# Patient Record
Sex: FEMALE | Race: WHITE | NOT HISPANIC OR LATINO | Employment: FULL TIME | URBAN - METROPOLITAN AREA
[De-identification: names, ages, dates, MRNs, and addresses within clinical notes are randomized per-mention and may not be internally consistent; named-entity substitution may affect disease eponyms.]

---

## 2016-08-17 LAB
EXTERNAL ANTIBODY SCREEN: NEGATIVE
EXTERNAL CHLAMYDIA SCREEN: NEGATIVE
EXTERNAL GONORRHEA SCREEN: NEGATIVE
EXTERNAL RUBELLA QUALITATIVE: NORMAL
EXTERNAL SYPHILIS RPR SCREEN: NORMAL
HIV1 AB SPEC QL IA.RAPID: NEGATIVE

## 2017-02-01 ENCOUNTER — LAB REQUISITION (OUTPATIENT)
Dept: LAB | Facility: HOSPITAL | Age: 32
End: 2017-02-01

## 2017-02-01 DIAGNOSIS — Z34.83 ENCOUNTER FOR SUPERVISION OF OTHER NORMAL PREGNANCY, THIRD TRIMESTER: ICD-10-CM

## 2017-02-01 PROCEDURE — 87081 CULTURE SCREEN ONLY: CPT | Performed by: OBSTETRICS & GYNECOLOGY

## 2017-02-02 ENCOUNTER — HOSPITAL ENCOUNTER (OUTPATIENT)
Facility: HOSPITAL | Age: 32
End: 2017-02-02
Attending: OBSTETRICS & GYNECOLOGY | Admitting: OBSTETRICS & GYNECOLOGY

## 2017-02-04 LAB — BACTERIA SPEC AEROBE CULT: NORMAL

## 2017-02-22 ENCOUNTER — HOSPITAL ENCOUNTER (OUTPATIENT)
Dept: LABOR AND DELIVERY | Facility: HOSPITAL | Age: 32
Discharge: HOME | End: 2017-02-22

## 2017-02-27 ENCOUNTER — HOSPITAL ENCOUNTER (INPATIENT)
Dept: LABOR AND DELIVERY | Facility: HOSPITAL | Age: 32
LOS: 3 days | Discharge: HOME | End: 2017-03-02
Attending: OBSTETRICS & GYNECOLOGY | Admitting: OBSTETRICS & GYNECOLOGY

## 2017-02-27 PROBLEM — Z3A.39 39 WEEKS GESTATION OF PREGNANCY: Status: ACTIVE | Noted: 2017-02-27

## 2017-02-27 PROCEDURE — 86900 BLOOD TYPING SEROLOGIC ABO: CPT

## 2017-02-27 PROCEDURE — 85027 COMPLETE CBC AUTOMATED: CPT | Performed by: OBSTETRICS & GYNECOLOGY

## 2017-02-27 PROCEDURE — 86850 RBC ANTIBODY SCREEN: CPT

## 2017-02-27 PROCEDURE — 86901 BLOOD TYPING SEROLOGIC RH(D): CPT

## 2017-02-27 PROCEDURE — 59025 FETAL NON-STRESS TEST: CPT

## 2017-02-27 RX ORDER — ONDANSETRON 4 MG/1
4 TABLET, FILM COATED ORAL EVERY 6 HOURS PRN
Status: DISCONTINUED | OUTPATIENT
Start: 2017-02-27 | End: 2017-02-28 | Stop reason: HOSPADM

## 2017-02-27 RX ORDER — SODIUM CHLORIDE, SODIUM LACTATE, POTASSIUM CHLORIDE, CALCIUM CHLORIDE 600; 310; 30; 20 MG/100ML; MG/100ML; MG/100ML; MG/100ML
125 INJECTION, SOLUTION INTRAVENOUS CONTINUOUS
Status: DISCONTINUED | OUTPATIENT
Start: 2017-02-28 | End: 2017-03-02 | Stop reason: HOSPADM

## 2017-02-27 RX ORDER — SODIUM CHLORIDE 0.9 % (FLUSH) 0.9 %
1-10 SYRINGE (ML) INJECTION AS NEEDED
Status: DISCONTINUED | OUTPATIENT
Start: 2017-02-27 | End: 2017-02-28 | Stop reason: HOSPADM

## 2017-02-27 RX ORDER — ACETAMINOPHEN 325 MG/1
650 TABLET ORAL EVERY 4 HOURS PRN
Status: DISCONTINUED | OUTPATIENT
Start: 2017-02-27 | End: 2017-02-28 | Stop reason: HOSPADM

## 2017-02-27 RX ORDER — FERROUS SULFATE 325(65) MG
325 TABLET ORAL
COMMUNITY
End: 2017-03-02 | Stop reason: HOSPADM

## 2017-02-27 RX ORDER — PRENATAL WITH FERROUS FUM AND FOLIC ACID 3080; 920; 120; 400; 22; 1.84; 3; 20; 10; 1; 12; 200; 27; 25; 2 [IU]/1; [IU]/1; MG/1; [IU]/1; MG/1; MG/1; MG/1; MG/1; MG/1; MG/1; UG/1; MG/1; MG/1; MG/1; MG/1
TABLET ORAL DAILY
COMMUNITY
End: 2018-03-13

## 2017-02-27 RX ORDER — ONDANSETRON 2 MG/ML
4 INJECTION INTRAMUSCULAR; INTRAVENOUS EVERY 6 HOURS PRN
Status: DISCONTINUED | OUTPATIENT
Start: 2017-02-27 | End: 2017-02-28 | Stop reason: HOSPADM

## 2017-02-27 RX ORDER — RANITIDINE HCL 75 MG
75 TABLET ORAL 2 TIMES DAILY
COMMUNITY
End: 2017-03-02 | Stop reason: HOSPADM

## 2017-02-27 RX ADMIN — SODIUM CHLORIDE, POTASSIUM CHLORIDE, SODIUM LACTATE AND CALCIUM CHLORIDE 1000 ML: 600; 310; 30; 20 INJECTION, SOLUTION INTRAVENOUS at 22:50

## 2017-02-28 ENCOUNTER — ANESTHESIA (OUTPATIENT)
Dept: LABOR AND DELIVERY | Facility: HOSPITAL | Age: 32
End: 2017-02-28

## 2017-02-28 ENCOUNTER — ANESTHESIA EVENT (OUTPATIENT)
Dept: LABOR AND DELIVERY | Facility: HOSPITAL | Age: 32
End: 2017-02-28

## 2017-02-28 LAB
ABO GROUP BLD: NORMAL
BLD GP AB SCN SERPL QL: NEGATIVE
DEPRECATED RDW RBC AUTO: 44.8 FL (ref 37–54)
ERYTHROCYTE [DISTWIDTH] IN BLOOD BY AUTOMATED COUNT: 14.3 % (ref 11.3–14.5)
EXTERNAL GROUP B STREP ANTIGEN: NEGATIVE
HCT VFR BLD AUTO: 33.7 % (ref 34.5–44)
HGB BLD-MCNC: 11.3 G/DL (ref 11.5–15.5)
MCH RBC QN AUTO: 29 PG (ref 27–31)
MCHC RBC AUTO-ENTMCNC: 33.5 G/DL (ref 32–36)
MCV RBC AUTO: 86.6 FL (ref 80–99)
PLATELET # BLD AUTO: 221 10*3/MM3 (ref 150–450)
PMV BLD AUTO: 10.6 FL (ref 6–12)
RBC # BLD AUTO: 3.89 10*6/MM3 (ref 3.89–5.14)
RH BLD: POSITIVE
WBC NRBC COR # BLD: 9.59 10*3/MM3 (ref 3.5–10.8)

## 2017-02-28 PROCEDURE — 10907ZC DRAINAGE OF AMNIOTIC FLUID, THERAPEUTIC FROM PRODUCTS OF CONCEPTION, VIA NATURAL OR ARTIFICIAL OPENING: ICD-10-PCS | Performed by: OBSTETRICS & GYNECOLOGY

## 2017-02-28 PROCEDURE — 59200 INSERT CERVICAL DILATOR: CPT | Performed by: OBSTETRICS & GYNECOLOGY

## 2017-02-28 PROCEDURE — 25010000002 ONDANSETRON PER 1 MG: Performed by: OBSTETRICS & GYNECOLOGY

## 2017-02-28 PROCEDURE — 0KQM0ZZ REPAIR PERINEUM MUSCLE, OPEN APPROACH: ICD-10-PCS | Performed by: OBSTETRICS & GYNECOLOGY

## 2017-02-28 PROCEDURE — 51703 INSERT BLADDER CATH COMPLEX: CPT

## 2017-02-28 PROCEDURE — 25010000002 ROPIVACAINE PER 1 MG: Performed by: NURSE ANESTHETIST, CERTIFIED REGISTERED

## 2017-02-28 PROCEDURE — C1755 CATHETER, INTRASPINAL: HCPCS

## 2017-02-28 PROCEDURE — 25010000002 BUTORPHANOL PER 1 MG: Performed by: OBSTETRICS & GYNECOLOGY

## 2017-02-28 PROCEDURE — 25010000002 FENTANYL CITRATE (PF) 100 MCG/2ML SOLUTION: Performed by: NURSE ANESTHETIST, CERTIFIED REGISTERED

## 2017-02-28 PROCEDURE — C1755 CATHETER, INTRASPINAL: HCPCS | Performed by: ANESTHESIOLOGY

## 2017-02-28 RX ORDER — IBUPROFEN 600 MG/1
600 TABLET ORAL EVERY 6 HOURS PRN
Status: DISCONTINUED | OUTPATIENT
Start: 2017-02-28 | End: 2017-03-02 | Stop reason: HOSPADM

## 2017-02-28 RX ORDER — CARBOPROST TROMETHAMINE 250 UG/ML
250 INJECTION, SOLUTION INTRAMUSCULAR AS NEEDED
Status: DISCONTINUED | OUTPATIENT
Start: 2017-02-28 | End: 2017-02-28 | Stop reason: HOSPADM

## 2017-02-28 RX ORDER — PROMETHAZINE HYDROCHLORIDE 25 MG/ML
12.5 INJECTION, SOLUTION INTRAMUSCULAR; INTRAVENOUS EVERY 6 HOURS PRN
Status: DISCONTINUED | OUTPATIENT
Start: 2017-02-28 | End: 2017-03-02 | Stop reason: HOSPADM

## 2017-02-28 RX ORDER — EPHEDRINE SULFATE/0.9% NACL/PF 50 MG/10ML
10 SYRINGE (ML) INTRAVENOUS
Status: DISCONTINUED | OUTPATIENT
Start: 2017-02-28 | End: 2017-02-28 | Stop reason: HOSPADM

## 2017-02-28 RX ORDER — DIPHENHYDRAMINE HYDROCHLORIDE 50 MG/ML
12.5 INJECTION INTRAMUSCULAR; INTRAVENOUS EVERY 8 HOURS PRN
Status: DISCONTINUED | OUTPATIENT
Start: 2017-02-28 | End: 2017-02-28 | Stop reason: HOSPADM

## 2017-02-28 RX ORDER — SODIUM CHLORIDE 0.9 % (FLUSH) 0.9 %
1-10 SYRINGE (ML) INJECTION AS NEEDED
Status: DISCONTINUED | OUTPATIENT
Start: 2017-02-28 | End: 2017-03-02 | Stop reason: HOSPADM

## 2017-02-28 RX ORDER — FENTANYL CITRATE 50 UG/ML
INJECTION, SOLUTION INTRAMUSCULAR; INTRAVENOUS AS NEEDED
Status: DISCONTINUED | OUTPATIENT
Start: 2017-02-28 | End: 2017-02-28 | Stop reason: SURG

## 2017-02-28 RX ORDER — DOCUSATE SODIUM 100 MG/1
100 CAPSULE, LIQUID FILLED ORAL 2 TIMES DAILY
Status: DISCONTINUED | OUTPATIENT
Start: 2017-02-28 | End: 2017-03-02 | Stop reason: HOSPADM

## 2017-02-28 RX ORDER — LANOLIN 100 %
OINTMENT (GRAM) TOPICAL
Status: DISCONTINUED | OUTPATIENT
Start: 2017-02-28 | End: 2017-03-02 | Stop reason: HOSPADM

## 2017-02-28 RX ORDER — METOCLOPRAMIDE HYDROCHLORIDE 5 MG/ML
10 INJECTION INTRAMUSCULAR; INTRAVENOUS ONCE AS NEEDED
Status: DISCONTINUED | OUTPATIENT
Start: 2017-02-28 | End: 2017-02-28 | Stop reason: HOSPADM

## 2017-02-28 RX ORDER — OXYTOCIN/RINGER'S LACTATE 20/1000 ML
125 PLASTIC BAG, INJECTION (ML) INTRAVENOUS AS NEEDED
Status: DISCONTINUED | OUTPATIENT
Start: 2017-02-28 | End: 2017-02-28 | Stop reason: HOSPADM

## 2017-02-28 RX ORDER — ZOLPIDEM TARTRATE 5 MG/1
5 TABLET ORAL NIGHTLY PRN
Status: DISCONTINUED | OUTPATIENT
Start: 2017-02-28 | End: 2017-03-02 | Stop reason: HOSPADM

## 2017-02-28 RX ORDER — PROMETHAZINE HYDROCHLORIDE 12.5 MG/1
12.5 SUPPOSITORY RECTAL EVERY 6 HOURS PRN
Status: DISCONTINUED | OUTPATIENT
Start: 2017-02-28 | End: 2017-03-02 | Stop reason: HOSPADM

## 2017-02-28 RX ORDER — PROMETHAZINE HYDROCHLORIDE 25 MG/1
25 TABLET ORAL EVERY 6 HOURS PRN
Status: DISCONTINUED | OUTPATIENT
Start: 2017-02-28 | End: 2017-03-02 | Stop reason: HOSPADM

## 2017-02-28 RX ORDER — METHYLERGONOVINE MALEATE 0.2 MG/ML
200 INJECTION INTRAVENOUS ONCE AS NEEDED
Status: DISCONTINUED | OUTPATIENT
Start: 2017-02-28 | End: 2017-02-28 | Stop reason: HOSPADM

## 2017-02-28 RX ORDER — ONDANSETRON 2 MG/ML
4 INJECTION INTRAMUSCULAR; INTRAVENOUS ONCE AS NEEDED
Status: DISCONTINUED | OUTPATIENT
Start: 2017-02-28 | End: 2017-02-28 | Stop reason: HOSPADM

## 2017-02-28 RX ORDER — LIDOCAINE HYDROCHLORIDE AND EPINEPHRINE 15; 5 MG/ML; UG/ML
INJECTION, SOLUTION EPIDURAL AS NEEDED
Status: DISCONTINUED | OUTPATIENT
Start: 2017-02-28 | End: 2017-02-28 | Stop reason: SURG

## 2017-02-28 RX ORDER — BISACODYL 10 MG
10 SUPPOSITORY, RECTAL RECTAL DAILY PRN
Status: DISCONTINUED | OUTPATIENT
Start: 2017-03-01 | End: 2017-03-02 | Stop reason: HOSPADM

## 2017-02-28 RX ORDER — OXYTOCIN/RINGER'S LACTATE 30/500 ML
2-24 PLASTIC BAG, INJECTION (ML) INTRAVENOUS
Status: DISCONTINUED | OUTPATIENT
Start: 2017-02-28 | End: 2017-03-02 | Stop reason: HOSPADM

## 2017-02-28 RX ORDER — OXYTOCIN/RINGER'S LACTATE 20/1000 ML
999 PLASTIC BAG, INJECTION (ML) INTRAVENOUS ONCE
Status: COMPLETED | OUTPATIENT
Start: 2017-02-28 | End: 2017-02-28

## 2017-02-28 RX ORDER — MISOPROSTOL 200 UG/1
800 TABLET ORAL AS NEEDED
Status: DISCONTINUED | OUTPATIENT
Start: 2017-02-28 | End: 2017-02-28 | Stop reason: HOSPADM

## 2017-02-28 RX ORDER — FAMOTIDINE 10 MG/ML
20 INJECTION, SOLUTION INTRAVENOUS ONCE AS NEEDED
Status: DISCONTINUED | OUTPATIENT
Start: 2017-02-28 | End: 2017-02-28 | Stop reason: HOSPADM

## 2017-02-28 RX ADMIN — ROPIVACAINE HYDROCHLORIDE 10 ML: 5 INJECTION, SOLUTION EPIDURAL; INFILTRATION; PERINEURAL at 10:37

## 2017-02-28 RX ADMIN — Medication 999 ML/HR: at 18:28

## 2017-02-28 RX ADMIN — Medication 2 MILLI-UNITS/MIN: at 06:20

## 2017-02-28 RX ADMIN — FENTANYL CITRATE 100 MCG: 50 INJECTION, SOLUTION INTRAMUSCULAR; INTRAVENOUS at 10:35

## 2017-02-28 RX ADMIN — ONDANSETRON 4 MG: 2 INJECTION INTRAMUSCULAR; INTRAVENOUS at 14:45

## 2017-02-28 RX ADMIN — IBUPROFEN 600 MG: 600 TABLET ORAL at 19:24

## 2017-02-28 RX ADMIN — SODIUM CHLORIDE, POTASSIUM CHLORIDE, SODIUM LACTATE AND CALCIUM CHLORIDE 125 ML/HR: 600; 310; 30; 20 INJECTION, SOLUTION INTRAVENOUS at 04:09

## 2017-02-28 RX ADMIN — SODIUM CHLORIDE, POTASSIUM CHLORIDE, SODIUM LACTATE AND CALCIUM CHLORIDE 125 ML/HR: 600; 310; 30; 20 INJECTION, SOLUTION INTRAVENOUS at 16:25

## 2017-02-28 RX ADMIN — LIDOCAINE HYDROCHLORIDE AND EPINEPHRINE 3 ML: 15; 5 INJECTION, SOLUTION EPIDURAL at 10:32

## 2017-02-28 RX ADMIN — Medication 125 ML/HR: at 19:13

## 2017-02-28 RX ADMIN — BUTORPHANOL TARTRATE 1 MG: 2 INJECTION, SOLUTION INTRAMUSCULAR; INTRAVENOUS at 01:28

## 2017-02-28 RX ADMIN — DINOPROSTONE 10 MG: 10 INSERT VAGINAL at 01:24

## 2017-02-28 RX ADMIN — LIDOCAINE HYDROCHLORIDE AND EPINEPHRINE 2 ML: 15; 5 INJECTION, SOLUTION EPIDURAL at 10:35

## 2017-02-28 RX ADMIN — ROPIVACAINE HYDROCHLORIDE 17 ML/HR: 5 INJECTION, SOLUTION EPIDURAL; INFILTRATION; PERINEURAL at 10:40

## 2017-02-28 RX ADMIN — BUTORPHANOL TARTRATE 1 MG: 2 INJECTION, SOLUTION INTRAMUSCULAR; INTRAVENOUS at 04:09

## 2017-02-28 RX ADMIN — SODIUM CHLORIDE, POTASSIUM CHLORIDE, SODIUM LACTATE AND CALCIUM CHLORIDE 1000 ML: 600; 310; 30; 20 INJECTION, SOLUTION INTRAVENOUS at 10:05

## 2017-03-01 LAB
BASOPHILS # BLD AUTO: 0.02 10*3/MM3 (ref 0–0.2)
BASOPHILS NFR BLD AUTO: 0.2 % (ref 0–1)
DEPRECATED RDW RBC AUTO: 45.9 FL (ref 37–54)
EOSINOPHIL # BLD AUTO: 0.11 10*3/MM3 (ref 0.1–0.3)
EOSINOPHIL NFR BLD AUTO: 0.9 % (ref 0–3)
ERYTHROCYTE [DISTWIDTH] IN BLOOD BY AUTOMATED COUNT: 14.5 % (ref 11.3–14.5)
HCT VFR BLD AUTO: 30.4 % (ref 34.5–44)
HGB BLD-MCNC: 9.9 G/DL (ref 11.5–15.5)
IMM GRANULOCYTES # BLD: 0.05 10*3/MM3 (ref 0–0.03)
IMM GRANULOCYTES NFR BLD: 0.4 % (ref 0–0.6)
LYMPHOCYTES # BLD AUTO: 2.18 10*3/MM3 (ref 0.6–4.8)
LYMPHOCYTES NFR BLD AUTO: 17 % (ref 24–44)
MCH RBC QN AUTO: 28.4 PG (ref 27–31)
MCHC RBC AUTO-ENTMCNC: 32.6 G/DL (ref 32–36)
MCV RBC AUTO: 87.1 FL (ref 80–99)
MONOCYTES # BLD AUTO: 1.01 10*3/MM3 (ref 0–1)
MONOCYTES NFR BLD AUTO: 7.9 % (ref 0–12)
NEUTROPHILS # BLD AUTO: 9.42 10*3/MM3 (ref 1.5–8.3)
NEUTROPHILS NFR BLD AUTO: 73.6 % (ref 41–71)
PLATELET # BLD AUTO: 172 10*3/MM3 (ref 150–450)
PMV BLD AUTO: 10.4 FL (ref 6–12)
RBC # BLD AUTO: 3.49 10*6/MM3 (ref 3.89–5.14)
WBC NRBC COR # BLD: 12.79 10*3/MM3 (ref 3.5–10.8)

## 2017-03-01 PROCEDURE — 85025 COMPLETE CBC W/AUTO DIFF WBC: CPT | Performed by: OBSTETRICS & GYNECOLOGY

## 2017-03-01 RX ORDER — HYDROCODONE BITARTRATE AND ACETAMINOPHEN 5; 325 MG/1; MG/1
1 TABLET ORAL EVERY 4 HOURS PRN
Status: DISCONTINUED | OUTPATIENT
Start: 2017-03-01 | End: 2017-03-02 | Stop reason: HOSPADM

## 2017-03-01 RX ADMIN — IBUPROFEN 600 MG: 600 TABLET ORAL at 07:35

## 2017-03-01 RX ADMIN — IBUPROFEN 600 MG: 600 TABLET ORAL at 15:26

## 2017-03-01 RX ADMIN — DOCUSATE SODIUM 100 MG: 100 CAPSULE, LIQUID FILLED ORAL at 17:28

## 2017-03-01 RX ADMIN — WITCH HAZEL 1 PAD: 500 SOLUTION RECTAL; TOPICAL at 00:41

## 2017-03-01 RX ADMIN — IBUPROFEN 600 MG: 600 TABLET ORAL at 00:41

## 2017-03-01 RX ADMIN — HYDROCODONE BITARTRATE AND ACETAMINOPHEN 1 TABLET: 5; 325 TABLET ORAL at 21:08

## 2017-03-01 RX ADMIN — HYDROCODONE BITARTRATE AND ACETAMINOPHEN 1 TABLET: 5; 325 TABLET ORAL at 11:39

## 2017-03-01 RX ADMIN — DOCUSATE SODIUM 100 MG: 100 CAPSULE, LIQUID FILLED ORAL at 07:35

## 2017-03-01 RX ADMIN — HYDROCORTISONE 2.5% 1 APPLICATION: 25 CREAM TOPICAL at 00:41

## 2017-03-01 NOTE — PLAN OF CARE
Problem: Patient Care Overview (Adult)  Goal: Plan of Care Review  Outcome: Ongoing (interventions implemented as appropriate)    02/28/17 2033   Coping/Psychosocial Response Interventions   Plan Of Care Reviewed With patient       Goal: Adult Individualization and Mutuality  Outcome: Ongoing (interventions implemented as appropriate)  Goal: Discharge Needs Assessment  Outcome: Ongoing (interventions implemented as appropriate)    Problem: Labor (Cervical Ripen, Induct, Augment) (Adult,Obstetrics,Pediatric)  Goal: Signs and Symptoms of Listed Potential Problems Will be Absent or Manageable (Labor)  Outcome: Outcome(s) achieved Date Met:  02/28/17 02/28/17 2033   Labor (Cervical Ripen, Induct, Augment)   Problems Assessed (Labor) all   Problems Present (Labor) none

## 2017-03-01 NOTE — PROGRESS NOTES
3/1/2017  PPD #1    Subjective   Jaci feels well.  Patient describes her lochia less than menses.  Pain is well controlled       Objective   Temp: Temp:  [97.8 °F (36.6 °C)-99.1 °F (37.3 °C)] 97.8 °F (36.6 °C) Temp src: Oral   BP: BP: ()/(52-83) 99/56        Pulse: Heart Rate:  [] 66  RR: Resp:  [16-18] 16    General:  No acute distress   Abdomen: Fundus firm and beneath umbilicus   Pelvis: deferred     Lab Results   Component Value Date    WBC 9.59 02/27/2017    HGB 11.3 (L) 02/27/2017    HCT 33.7 (L) 02/27/2017    MCV 86.6 02/27/2017     02/27/2017     Infant male. Doing well. Desires circ.     Assessment  1. PPD# 1 after vaginal delivery    Plan  1. Routine postpartum care.      This note has been electronically signed.    Kelin Torres, APRN  March 1, 2017

## 2017-03-01 NOTE — ANESTHESIA POSTPROCEDURE EVALUATION
Patient: Jaci Armstrong    Procedure Summary     Date Anesthesia Start Anesthesia Stop Room / Location    02/28/17 1022 1828        Procedure Diagnosis Scheduled Providers Provider    LABOR ANALGESIA No diagnosis on file.  Carito Kendall DO          Anesthesia Type: epidural  Last vitals  BP      Temp      Pulse     Resp      SpO2        Post Anesthesia Care and Evaluation    Patient location during evaluation: bedside  Patient participation: complete - patient participated  Level of consciousness: awake and alert  Pain management: adequate  Airway patency: patent  Anesthetic complications: No anesthetic complications    Cardiovascular status: acceptable  Respiratory status: acceptable  Hydration status: acceptable  Post Neuraxial Block status: Motor and sensory function returned to baseline and No signs or symptoms of PDPH

## 2017-03-01 NOTE — NURSING NOTE
2000- Patient ambulated to bathroom with nurse assist. Bleeding small. Fundus firm. Gait steady. Swelling noted in perineum. Void noted. Pads and panties placed on patient. Gown changed. Ambulated back to wheelchair. Moved to mother baby accompanied by nurse, FOB, and infant.     2015- Report given to mother baby nurse. All care assumed at this time.

## 2017-03-01 NOTE — LACTATION NOTE
03/01/17 1030   Maternal Information   Person Making Referral nurse   Maternal Reason for Referral breastfeeding currently   Maternal Information   Language Assistance Needed no   Maternal Infant Assessment   Size Issue, Bilateral Breasts no   Shape, Bilateral Breasts round   Density, Bilateral Breasts soft   Nipples, Bilateral everted   Nipple Conditions, Bilateral intact   Additional Documentation (Latch) LATCH Score (Group)   Infant Assessment   Sucking Reflex present   Rooting Reflex present   Swallow Reflex present   LATCH Score   Latch 2-->grasps breast, tongue down, lips flanged, rhythmic sucking   Audible Swallowing 1-->a few with stimulation   Type Of Nipple 2-->everted (after stimulation)   Comfort (Breast/Nipple) 2-->soft/nontender   Hold (Positioning) 1-->minimal assist, teach one side: mother does other, staff holds   Score (less than 7 for 2/more consecutive times, consult Lactation Consultant) 8   Feeding Infant   Feeding Readiness Cues rooting   Satiety Cues calm after feeding   Effective Latch During Feeding yes   Audible Swallow yes   Suck/Swallow Coordination present

## 2017-03-02 VITALS
WEIGHT: 191 LBS | TEMPERATURE: 98.2 F | HEART RATE: 72 BPM | HEIGHT: 68 IN | SYSTOLIC BLOOD PRESSURE: 126 MMHG | BODY MASS INDEX: 28.95 KG/M2 | RESPIRATION RATE: 16 BRPM | DIASTOLIC BLOOD PRESSURE: 74 MMHG

## 2017-03-02 PROBLEM — Z3A.39 39 WEEKS GESTATION OF PREGNANCY: Status: RESOLVED | Noted: 2017-02-27 | Resolved: 2017-03-02

## 2017-03-02 RX ORDER — IBUPROFEN 600 MG/1
600 TABLET ORAL EVERY 6 HOURS PRN
Qty: 30 TABLET | Refills: 0 | Status: SHIPPED | OUTPATIENT
Start: 2017-03-02 | End: 2018-03-13

## 2017-03-02 RX ORDER — IBUPROFEN 600 MG/1
600 TABLET ORAL EVERY 6 HOURS PRN
Qty: 30 TABLET | Refills: 0 | Status: SHIPPED | OUTPATIENT
Start: 2017-03-02 | End: 2017-03-02

## 2017-03-02 RX ORDER — HYDROCODONE BITARTRATE AND ACETAMINOPHEN 5; 325 MG/1; MG/1
1-2 TABLET ORAL EVERY 4 HOURS PRN
Qty: 20 TABLET | Refills: 0 | Status: SHIPPED | OUTPATIENT
Start: 2017-03-02 | End: 2017-03-02

## 2017-03-02 RX ORDER — HYDROCODONE BITARTRATE AND ACETAMINOPHEN 5; 325 MG/1; MG/1
1-2 TABLET ORAL EVERY 4 HOURS PRN
Qty: 20 TABLET | Refills: 0 | Status: SHIPPED | OUTPATIENT
Start: 2017-03-02 | End: 2017-03-05

## 2017-03-02 RX ADMIN — IBUPROFEN 600 MG: 600 TABLET ORAL at 08:30

## 2017-03-02 RX ADMIN — DOCUSATE SODIUM 100 MG: 100 CAPSULE, LIQUID FILLED ORAL at 08:31

## 2017-03-02 RX ADMIN — HYDROCODONE BITARTRATE AND ACETAMINOPHEN 1 TABLET: 5; 325 TABLET ORAL at 08:30

## 2017-03-02 NOTE — DISCHARGE SUMMARY
Discharge Summary    Date of Admission: 2017  Date of Discharge:  3/2/2017      Patient: Jaci Armstrong      MR#:[id number]    Delivery Provider: Hawa Tang     Presenting Problem/History of Present Illness  39 weeks gestation of pregnancy [Z3A.39]     Patient Active Problem List   Diagnosis   (none) - all problems resolved or deleted         Discharge Diagnosis: Vaginal delivery at 40w0d    Procedures:  Vaginal, Spontaneous Delivery     2017    6:23 PM        Discharge Date: 3/2/2017;     Hospital Course  Patient is a 31 y.o. female  at 40w0d status post vaginal delivery without complication. Postpartum the patient did well. She remained afebrile, with vital signs stable. She was ready for discharge on postpartum day 2.     Infant:   male  fetus 8 lb 7.8 oz (3.85 kg)  with Apgar scores of 8  , 9   at five minutes.    Condition on Discharge:  Stable    Vital Signs  Temp:  [97.8 °F (36.6 °C)-98.2 °F (36.8 °C)] 98.2 °F (36.8 °C)  Heart Rate:  [64-72] 72  Resp:  [16] 16  BP: (117-127)/(66-74) 126/74    Lab Results   Component Value Date    WBC 12.79 (H) 2017    HGB 9.9 (L) 2017    HCT 30.4 (L) 2017    MCV 87.1 2017     2017       Discharge Disposition  Home or Self Care    Discharge Medications   Jaci Armstrong   Home Medication Instructions MARIA EUGENIA:[id number]    Printed on:17 0844   Medication Information                      HYDROcodone-acetaminophen (NORCO) 5-325 MG per tablet  Take 1-2 tablets by mouth Every 4 (Four) Hours As Needed for moderate pain (4-6) for up to 3 days.             ibuprofen (ADVIL,MOTRIN) 600 MG tablet  Take 1 tablet by mouth Every 6 (Six) Hours As Needed for mild pain (1-3).             Prenatal Vit-Fe Fumarate-FA (PRENATAL -) 27-1 MG tablet tablet  Take  by mouth Daily.                 Discharge Diet:     Activity at Discharge:   Activity Instructions     Pelvic Rest                     Follow-up Appointments  No future  appointments.  Additional Instructions for the Follow-ups that You Need to Schedule     Call MD for problems / concerns.    As directed        Discharge Follow-up with Specified Provider    As directed    To:  Dr Philip   Follow Up:  6 Weeks   Follow Up Details:  Patient must be seen by 6 weeks postpartum       Follow-Up    As directed    Follow Up Details:  6 weeks with Mirta Mcmanus, ALESSANDRO  03/02/17  8:44 AM  Csd

## 2017-03-02 NOTE — PLAN OF CARE
Problem: Patient Care Overview (Adult)  Goal: Plan of Care Review  Outcome: Outcome(s) achieved Date Met:  17  Goal: Adult Individualization and Mutuality  Outcome: Outcome(s) achieved Date Met:  17  Goal: Discharge Needs Assessment  Outcome: Outcome(s) achieved Date Met:  17    Problem: Breastfeeding (Adult,NICU,[place],Obstetrics,Pediatric)  Goal: Signs and Symptoms of Listed Potential Problems Will be Absent or Manageable (Breastfeeding)  Outcome: Outcome(s) achieved Date Met:  17    Problem: Postpartum, Vaginal Delivery (Adult)  Goal: Signs and Symptoms of Listed Potential Problems Will be Absent or Manageable (Postpartum, Vaginal Delivery)  Outcome: Outcome(s) achieved Date Met:  17 0802   Postpartum, Vaginal Delivery   Problems Assessed (Postpartum Vaginal Delivery) all   Problems Present (Postpartum Vaginal Delivery) none

## 2017-03-02 NOTE — PLAN OF CARE
Problem: Patient Care Overview (Adult)  Goal: Plan of Care Review  Outcome: Ongoing (interventions implemented as appropriate)    03/01/17 1922   Coping/Psychosocial Response Interventions   Plan Of Care Reviewed With patient   Patient Care Overview   Progress progress toward functional goals as expected   Outcome Evaluation   Outcome Summary/Follow up Plan Continue to breastfeed on demand and report any nipple damage if it occurs.

## 2017-03-02 NOTE — PLAN OF CARE
Problem: Patient Care Overview (Adult)  Goal: Plan of Care Review  Outcome: Ongoing (interventions implemented as appropriate)    17 0527   Coping/Psychosocial Response Interventions   Plan Of Care Reviewed With patient   Patient Care Overview   Progress improving   Outcome Evaluation   Outcome Summary/Follow up Plan VSS, fundus and lochia WDL, breastfeeding well       Goal: Adult Individualization and Mutuality  Outcome: Ongoing (interventions implemented as appropriate)  Goal: Discharge Needs Assessment  Outcome: Ongoing (interventions implemented as appropriate)    Problem: Breastfeeding (Adult,NICU,[place],Obstetrics,Pediatric)  Goal: Signs and Symptoms of Listed Potential Problems Will be Absent or Manageable (Breastfeeding)  Outcome: Ongoing (interventions implemented as appropriate)    Problem: Postpartum, Vaginal Delivery (Adult)  Goal: Signs and Symptoms of Listed Potential Problems Will be Absent or Manageable (Postpartum, Vaginal Delivery)  Outcome: Ongoing (interventions implemented as appropriate)

## 2017-03-09 ENCOUNTER — APPOINTMENT (OUTPATIENT)
Dept: LACTATION | Facility: HOSPITAL | Age: 32
End: 2017-03-09

## 2020-06-29 ENCOUNTER — LAB REQUISITION (OUTPATIENT)
Dept: LAB | Facility: HOSPITAL | Age: 35
End: 2020-06-29

## 2020-06-29 DIAGNOSIS — Z00.00 ROUTINE GENERAL MEDICAL EXAMINATION AT A HEALTH CARE FACILITY: ICD-10-CM

## 2020-06-29 LAB — HCG INTACT+B SERPL-ACNC: <0.5 MIU/ML

## 2020-06-29 PROCEDURE — 84702 CHORIONIC GONADOTROPIN TEST: CPT | Performed by: OBSTETRICS & GYNECOLOGY

## 2020-09-09 ENCOUNTER — TELEPHONE (OUTPATIENT)
Dept: OBSTETRICS AND GYNECOLOGY | Facility: CLINIC | Age: 35
End: 2020-09-09

## 2020-09-09 NOTE — TELEPHONE ENCOUNTER
Patient called, stating she needs a refill on SERTRALINE 50MG TAB. Patient states she 1 pill remaining. Please send to [facility] in [place].       Patient has an appointment scheduled o 10/7.

## 2020-10-07 ENCOUNTER — OFFICE VISIT (OUTPATIENT)
Dept: OBSTETRICS AND GYNECOLOGY | Facility: CLINIC | Age: 35
End: 2020-10-07

## 2020-10-07 VITALS
SYSTOLIC BLOOD PRESSURE: 124 MMHG | DIASTOLIC BLOOD PRESSURE: 70 MMHG | HEIGHT: 68 IN | BODY MASS INDEX: 25.31 KG/M2 | WEIGHT: 167 LBS

## 2020-10-07 DIAGNOSIS — R10.2 PELVIC PAIN: ICD-10-CM

## 2020-10-07 DIAGNOSIS — Z00.00 ANNUAL PHYSICAL EXAM: Primary | ICD-10-CM

## 2020-10-07 DIAGNOSIS — N80.9 ENDOMETRIOSIS: ICD-10-CM

## 2020-10-07 PROCEDURE — 99395 PREV VISIT EST AGE 18-39: CPT | Performed by: OBSTETRICS & GYNECOLOGY

## 2020-10-07 NOTE — PROGRESS NOTES
GYN Annual Exam   Her next frozen transfer with Dr. Awadalla is 10/22  She is on Estrogen this week and prog next week  Dx Lap with Dr. Awadalla 2019  Failed IVF fresh    CC - Here for annual exam.     Subjective   HPI  Jaci Armstrong is a 34 y.o. female, , who presents for annual well woman exam.   Patient's last menstrual period was 10/02/2020..  Periods are regular every 28-30 days, lasting 7 days.  Dysmenorrhea:moderate, occurring throughout menses.  Patient reports problems with: trying to conceive.  Partner Status: Marital Status: .  New Partners since last visit:  no.  Desires STD Screening:  No.  She has very heavy flow with clots    Additional OB/GYN History   Current contraception: none  Last Pap : [id number] in [place] - neg  Last Completed Pap Smear       Status Date      PAP SMEAR No completions recorded        History of abnormal Pap smear: no  Family history of uterine, colon, breast, or ovarian cancer: no  Performs monthly Self-Breast Exam: yes  Last mammogram:   Last Completed Mammogram     Patient has no health maintenance due at this time        Feelings of Anxiety or Depression: no  Tobacco Usage?: No   OB History        1    Para   1    Term   1            AB        Living   1       SAB        TAB        Ectopic        Molar        Multiple   0    Live Births   1                Health Maintenance   Topic Date Due   • Annual Gynecologic Pelvic and Breast Exam  1986   • ANNUAL PHYSICAL  1989   • TDAP/TD VACCINES (1 - Tdap) 2005   • INFLUENZA VACCINE  2020   • HEPATITIS C SCREENING  2020   • PAP SMEAR  2020   • Pneumococcal Vaccine 0-64  Aged Out         Current Outpatient Medications:   •  sertraline (ZOLOFT) 50 MG tablet, Take 1 tablet by mouth Daily., Disp: 30 tablet, Rfl: 1  •  LESSINA 0.1-20 MG-MCG per tablet, , Disp: , Rfl:   •  oseltamivir (TAMIFLU) 75 MG capsule, Take 1 capsule by mouth 2 (Two) Times a Day., Disp: 10 capsule, Rfl:  "0  •  PredniSONE (DELTASONE) 10 MG (21) tablet pack, Daily taper- 6/5/4/3/2/1, Disp: 21 tablet, Rfl: 0    The additional following portions of the patient's history were reviewed and updated as appropriate: allergies, current medications, past family history, past medical history, past social history, past surgical history and problem list.    Review of Systems   Constitutional: Negative.    HENT: Negative.    Eyes: Negative.    Respiratory: Negative.    Cardiovascular: Negative.    Gastrointestinal: Negative.    Endocrine: Negative.    Genitourinary: Negative.    Musculoskeletal: Negative.    Skin: Negative.    Allergic/Immunologic: Negative.    Neurological: Negative.    Hematological: Negative.    Psychiatric/Behavioral: Negative.        I have reviewed and agree with the HPI, ROS, and historical information as entered above. Negro Philip MD    Objective   /70   Ht 172.7 cm (68\")   Wt 75.8 kg (167 lb)   LMP 10/02/2020   Breastfeeding No   BMI 25.39 kg/m²     PE  Breast: Without masses, no skin changes or retractions  Axilla, normal, no lymphadenopathy  Heart-regular rate no murmurs rubs or gallops  Lungs-clear, normal breath sounds bilaterally  Abd-soft nontender, no hepatosplenomegaly, no guarding or rebound, no masses  Pelvic exam-  External genitalia normal no bleeding  Vagina normal mucosa no inflammation or bleeding  Bladder nontender, normal position, normal urethral meatus  Cervix without lesions discharge or bleeding  Bimanual nontender, adnexa clear, uterus normal size midplane  Anus no lesions no hemorrhoids     Assessment/Plan     Assessment     Problem List Items Addressed This Visit        Nervous and Auditory    Pelvic pain       Other    Endometriosis      Other Visit Diagnoses     Annual physical exam    -  Primary    Relevant Orders    Pap IG, HPV-hr          1. GYN annual well woman exam.   2. History of endometriosis and pelvic pain, pain is tolerable  3. Patient currently " undergoing cycles of IVF with Dr. Awadalla    Plan     1. Next pap due in: 2 to 3 years   2. Patient is normal weight does not smoke or drink  3. Healthy lifestyles including diet and exercise reviewed  4. We wished her the best on her upcoming IVF cycle      Negro Philip MD  10/07/2020

## 2020-10-15 DIAGNOSIS — Z00.00 ANNUAL PHYSICAL EXAM: ICD-10-CM

## 2020-11-03 ENCOUNTER — LAB REQUISITION (OUTPATIENT)
Dept: LAB | Facility: HOSPITAL | Age: 35
End: 2020-11-03

## 2020-11-03 DIAGNOSIS — Z00.00 ROUTINE GENERAL MEDICAL EXAMINATION AT A HEALTH CARE FACILITY: ICD-10-CM

## 2020-11-03 LAB — HCG INTACT+B SERPL-ACNC: 674.5 MIU/ML

## 2020-11-03 PROCEDURE — 84702 CHORIONIC GONADOTROPIN TEST: CPT | Performed by: OBSTETRICS & GYNECOLOGY

## 2020-12-08 ENCOUNTER — INITIAL PRENATAL (OUTPATIENT)
Dept: OBSTETRICS AND GYNECOLOGY | Facility: CLINIC | Age: 35
End: 2020-12-08

## 2020-12-08 VITALS — SYSTOLIC BLOOD PRESSURE: 110 MMHG | BODY MASS INDEX: 25.85 KG/M2 | DIASTOLIC BLOOD PRESSURE: 66 MMHG | WEIGHT: 170 LBS

## 2020-12-08 DIAGNOSIS — O30.041 DICHORIONIC DIAMNIOTIC TWIN PREGNANCY IN FIRST TRIMESTER: ICD-10-CM

## 2020-12-08 DIAGNOSIS — Z3A.09 9 WEEKS GESTATION OF PREGNANCY: Primary | ICD-10-CM

## 2020-12-08 PROCEDURE — 0501F PRENATAL FLOW SHEET: CPT | Performed by: OBSTETRICS & GYNECOLOGY

## 2020-12-08 RX ORDER — ESTRADIOL 2 MG/1
TABLET ORAL
COMMUNITY
Start: 2020-10-01 | End: 2021-05-20

## 2020-12-08 NOTE — PROGRESS NOTES
Initial ob visit     CC- Here for care of pregnancy   Flu vaccine  already  2 prior US's  Bleeding 3 weeks age - O+ [facility]  Already taking Zofran 4mg (unsure) she has already tried Diclegis and Bonesta       Jaci Armstrong is a 35 y.o. female, , who presents for her first obstetrical visit.  Her last LMP was No LMP recorded. Patient is pregnant. Unsure of exact date She is taking estrogen, prog supp this week and next also vit d 3, PNV with DHA and added Omega 3  .    OB History    Para Term  AB Living   2 1 1     1   SAB TAB Ectopic Molar Multiple Live Births           0 1      # Outcome Date GA Lbr Pablo/2nd Weight Sex Delivery Anes PTL Lv   2 Current            1 Term 17 40w0d 07:08 / 01:15 3850 g (8 lb 7.8 oz) M Vag-Spont EPI N CHRISTINE       Initial positive test date : 10/29/2020, UPT          Prior obstetric issues, potential pregnancy concerns: bicornuate uterus  Family history of genetic issues (includes FOB): kidney disease  Prior infections concerning in pregnancy (Rash, fever in last 2 weeks): no  Varicella Hx -hx  Prior testing for Cystic Fibrosis Carrier or Sickle Cell Trait- no  Prepregnancy BMI - Body mass index is 25.85 kg/m².  History of STD: no  Ultrasound Today: Yes.  Findings showed viable twins.  I have personally evaluated the U/S and agree with the findings. Negro Philip MD    Additional Pertinent History   Last Pap :   Last Completed Pap Smear       Status Date      PAP SMEAR Done 10/15/2020 PAP IG, HPV-HR     Patient has more history with this topic...        History of abnormal Pap smear: no  Family history of uterine, colon, breast, or ovarian cancer: no  Feelings of Anxiety or Depression: no  Tobacco Usage?: No   Alcohol/Drug Use?: NO  Over the age of 35 at delivery: no  Desires Genetic Screening: Cell Free DNA  Flu Status: Already given in current flu season    PMH  Past Medical History:   Diagnosis Date   • Bicornuate uterus    • Bicornuate uterus     • Encounter for annual routine gynecological examination    • Endometriosis    • Headache    • Hyperemesis gravidarum    • Screening breast examination    •  (spontaneous vaginal delivery)        Current Outpatient Medications:   •  estradiol (ESTRACE) 2 MG tablet, , Disp: , Rfl:   •  Prenat w/o U-JH-Pbitvgn-FA-DHA (PNV-DHA PO), Take  by mouth., Disp: , Rfl:   •  Progesterone 200 MG suppository, Insert  into the vagina., Disp: , Rfl:   •  oseltamivir (TAMIFLU) 75 MG capsule, Take 1 capsule by mouth 2 (Two) Times a Day., Disp: 10 capsule, Rfl: 0  •  PredniSONE (DELTASONE) 10 MG (21) tablet pack, Daily taper- 6//4/3/, Disp: 21 tablet, Rfl: 0  •  sertraline (ZOLOFT) 50 MG tablet, Take 1 tablet by mouth Daily., Disp: 30 tablet, Rfl: 12    The additional following portions of the patient's history were reviewed and updated as appropriate: allergies, current medications, past family history, past medical history, past social history, past surgical history and problem list.    Review of Systems   Review of Systems  Current obstetric complaints : Nausea and Vomiting   All systems reviewed and otherwise normal.    I have reviewed and agree with the HPI, ROS, and historical information as entered above. Negro Philip MD    /66   Wt 77.1 kg (170 lb)   BMI 25.85 kg/m²     Physical Exam  General:  well developed; well nourished  no acute distress   Chest/Respiratory: No labored breathing, normal respiratory effort, normal appearance, no respiratory noises noted   Heart:  normal rate, regular rhythm,  no murmurs, rubs, or gallops   Thyroid: normal to inspection and palpation   Breasts:  Not performed.   Abdomen: soft, non-tender; no masses  no umbilical or inguinal hernias are present  no hepato-splenomegaly   Pelvis: Not performed.        Assessment and Plan    Problem List Items Addressed This Visit        Other    Dichorionic diamniotic twin pregnancy in first trimester    Relevant Orders    US Ob < 14  Weeks Single or First Gestation    US Ob < 14 Weeks Each Additional Gestation      Other Visit Diagnoses     9 weeks gestation of pregnancy    -  Primary    Relevant Orders    Chlamydia trachomatis, Neisseria gonorrhoeae, PCR w/ confirmation - Urine, Urine, Clean Catch    HIV-1 / O / 2 Ag / Antibody 4th Generation    Obstetric Panel    Urine Culture - Urine, Urine, Clean Catch    Urine Drug Screen - Urine, Clean Catch    Urinalysis With Microscopic - Urine, Clean Catch          1. Pregnancy at Unknown  2. Reviewed routine prenatal care with the office and educational materials given  3. Reviewed special tests and concerns with twins  Return in about 2 weeks (around 12/22/2020), or Cell free DNA, for prenatal visit.      Negro Philip MD  12/08/2020

## 2020-12-11 LAB
ABO GROUP BLD: NORMAL
AMPHETAMINES UR QL SCN: NEGATIVE NG/ML
APPEARANCE UR: CLEAR
BACTERIA #/AREA URNS HPF: NORMAL /[HPF]
BACTERIA UR CULT: ABNORMAL
BACTERIA UR CULT: ABNORMAL
BARBITURATES UR QL SCN: NEGATIVE NG/ML
BASOPHILS # BLD AUTO: 0 X10E3/UL (ref 0–0.2)
BASOPHILS NFR BLD AUTO: 0 %
BENZODIAZ UR QL SCN: NEGATIVE NG/ML
BILIRUB UR QL STRIP: NEGATIVE
BLD GP AB SCN SERPL QL: NEGATIVE
BZE UR QL SCN: NEGATIVE NG/ML
C TRACH RRNA SPEC QL NAA+PROBE: NEGATIVE
CANNABINOIDS UR QL SCN: NEGATIVE NG/ML
COLOR UR: YELLOW
CREAT UR-MCNC: 160 MG/DL (ref 20–300)
EOSINOPHIL # BLD AUTO: 0 X10E3/UL (ref 0–0.4)
EOSINOPHIL NFR BLD AUTO: 0 %
EPI CELLS #/AREA URNS HPF: NORMAL /HPF (ref 0–10)
ERYTHROCYTE [DISTWIDTH] IN BLOOD BY AUTOMATED COUNT: 12.1 % (ref 11.7–15.4)
GLUCOSE UR QL: NEGATIVE
HBV SURFACE AG SERPL QL IA: NEGATIVE
HCT VFR BLD AUTO: 35.1 % (ref 34–46.6)
HCV AB S/CO SERPL IA: <0.1 S/CO RATIO (ref 0–0.9)
HGB BLD-MCNC: 11.8 G/DL (ref 11.1–15.9)
HGB UR QL STRIP: NEGATIVE
HIV 1+2 AB+HIV1 P24 AG SERPL QL IA: NON REACTIVE
IMM GRANULOCYTES # BLD AUTO: 0 X10E3/UL (ref 0–0.1)
IMM GRANULOCYTES NFR BLD AUTO: 0 %
KETONES UR QL STRIP: NEGATIVE
LABORATORY COMMENT REPORT: NORMAL
LEUKOCYTE ESTERASE UR QL STRIP: ABNORMAL
LYMPHOCYTES # BLD AUTO: 1.9 X10E3/UL (ref 0.7–3.1)
LYMPHOCYTES NFR BLD AUTO: 26 %
MCH RBC QN AUTO: 28.4 PG (ref 26.6–33)
MCHC RBC AUTO-ENTMCNC: 33.6 G/DL (ref 31.5–35.7)
MCV RBC AUTO: 85 FL (ref 79–97)
METHADONE UR QL SCN: NEGATIVE NG/ML
MICRO URNS: ABNORMAL
MONOCYTES # BLD AUTO: 0.5 X10E3/UL (ref 0.1–0.9)
MONOCYTES NFR BLD AUTO: 7 %
MUCOUS THREADS URNS QL MICRO: PRESENT
N GONORRHOEA RRNA SPEC QL NAA+PROBE: NEGATIVE
NEUTROPHILS # BLD AUTO: 5 X10E3/UL (ref 1.4–7)
NEUTROPHILS NFR BLD AUTO: 67 %
NITRITE UR QL STRIP: NEGATIVE
OPIATES UR QL SCN: NEGATIVE NG/ML
OXYCODONE+OXYMORPHONE UR QL SCN: NEGATIVE NG/ML
PCP UR QL: NEGATIVE NG/ML
PH UR STRIP: 6 [PH] (ref 5–7.5)
PH UR: 6 [PH] (ref 4.5–8.9)
PLATELET # BLD AUTO: 265 X10E3/UL (ref 150–450)
PROPOXYPH UR QL SCN: NEGATIVE NG/ML
PROT UR QL STRIP: NEGATIVE
RBC # BLD AUTO: 4.15 X10E6/UL (ref 3.77–5.28)
RBC #/AREA URNS HPF: NORMAL /HPF (ref 0–2)
RH BLD: POSITIVE
RPR SER QL: NON REACTIVE
RUBV IGG SERPL IA-ACNC: 1.86 INDEX
SP GR UR: 1.02 (ref 1–1.03)
UROBILINOGEN UR STRIP-MCNC: 0.2 MG/DL (ref 0.2–1)
WBC # BLD AUTO: 7.4 X10E3/UL (ref 3.4–10.8)
WBC #/AREA URNS HPF: NORMAL /HPF (ref 0–5)

## 2020-12-22 ENCOUNTER — ROUTINE PRENATAL (OUTPATIENT)
Dept: OBSTETRICS AND GYNECOLOGY | Facility: CLINIC | Age: 35
End: 2020-12-22

## 2020-12-22 VITALS — SYSTOLIC BLOOD PRESSURE: 118 MMHG | DIASTOLIC BLOOD PRESSURE: 62 MMHG | WEIGHT: 173 LBS | BODY MASS INDEX: 26.3 KG/M2

## 2020-12-22 DIAGNOSIS — Z34.90 PREGNANCY, UNSPECIFIED GESTATIONAL AGE: Primary | ICD-10-CM

## 2020-12-22 DIAGNOSIS — O44.40 LOW-LYING PLACENTA: ICD-10-CM

## 2020-12-22 DIAGNOSIS — O30.041 DICHORIONIC DIAMNIOTIC TWIN PREGNANCY IN FIRST TRIMESTER: ICD-10-CM

## 2020-12-22 LAB
EXPIRATION DATE: 0
GLUCOSE UR STRIP-MCNC: NEGATIVE MG/DL
Lab: 0
PROT UR STRIP-MCNC: NEGATIVE MG/DL

## 2020-12-22 PROCEDURE — 0502F SUBSEQUENT PRENATAL CARE: CPT | Performed by: OBSTETRICS & GYNECOLOGY

## 2020-12-22 NOTE — PROGRESS NOTES
"OB FOLLOW UP    Jaci Armstrong is a 35 y.o.  11w2d patient being seen today for her obstetrical follow up visit. Patient reports nausea. Zofran occasionally \"takes the edge off\". Denies emeses. U/S today  (see report)     Her prenatal care is complicated by (and status): bicornuate uterus with twin pregnancy, placenta previa with concern for vasa previa    ROS -   Contractions: no   problems: no  GI problems: nausea, no emeses  Bleeding or Leaking: no  Fetal Movement: present on U/S      Current Outpatient Medications:   •  estradiol (ESTRACE) 2 MG tablet, , Disp: , Rfl:   •  oseltamivir (TAMIFLU) 75 MG capsule, Take 1 capsule by mouth 2 (Two) Times a Day., Disp: 10 capsule, Rfl: 0  •  PredniSONE (DELTASONE) 10 MG (21) tablet pack, Daily taper- 6///3//, Disp: 21 tablet, Rfl: 0  •  Prenat w/o S-AB-Ebgkffc-FA-DHA (PNV-DHA PO), Take  by mouth., Disp: , Rfl:   •  Progesterone 200 MG suppository, Insert  into the vagina., Disp: , Rfl:   •  sertraline (ZOLOFT) 50 MG tablet, Take 1 tablet by mouth Daily., Disp: 30 tablet, Rfl: 12    /62   Wt 78.5 kg (173 lb)   BMI 26.30 kg/m²     FHT:  150 BPM    Uterine Size: size equals dates   Presentations: unsure        Uterus-nontender   Assessment    1. Pregnancy at 11w2d  2. Fetal status reassuring     Problem List Items Addressed This Visit        Genitourinary    Low-lying placenta       Other    Dichorionic diamniotic twin pregnancy in first trimester    Relevant Orders    [id number] PLUS Core+SCA+ESS - Blood,      Other Visit Diagnoses     Pregnancy, unspecified gestational age    -  Primary    Relevant Orders    POC Glucose, Urine, Qualitative, Dipstick (Completed)    POC Protein, Urine, Qualitative, Dipstick (Completed)    [id number] PLUS Core+SCA+ESS - Blood,          Plan    1. P/patient return in 4 weeks  2. Prenatal teaching done and patient questions were answered  3. Di-ditwins cell free DNA today, possible  placenta previa, ultrasound " monthly    Negro Philip MD  12/22/2020

## 2020-12-23 RX ORDER — ONDANSETRON 4 MG/1
4 TABLET, ORALLY DISINTEGRATING ORAL EVERY 8 HOURS PRN
Qty: 20 TABLET | Refills: 0 | Status: SHIPPED | OUTPATIENT
Start: 2020-12-23 | End: 2021-06-02

## 2020-12-27 LAB
5P15 DELETION (CRI-DU-CHAT): NOT DETECTED
CFDNA.FET/CFDNA.TOTAL SFR FETUS: NORMAL %
CITATION REF LAB TEST: NORMAL
FET 13+18+21+X+Y ANEUP PLAS.CFDNA: NEGATIVE
FET 1P36 DEL RISK WBC.DNA+CFDNA QL: NOT DETECTED
FET 22Q11.2 DEL RISK WBC.DNA+CFDNA QL: NOT DETECTED
FET CHR 11Q23 DEL PLAS.CFDNA QL: NOT DETECTED
FET CHR 15Q11 DEL PLAS.CFDNA QL: NOT DETECTED
FET CHR 21 TS PLAS.CFDNA QL: NEGATIVE
FET CHR 4P16 DEL PLAS.CFDNA QL: NOT DETECTED
FET CHR 8Q24 DEL PLAS.CFDNA QL: NOT DETECTED
FET SEX PLAS.CFDNA DOSAGE CFDNA: NORMAL
FET TS 13 RISK PLAS.CFDNA QL: NEGATIVE
FET TS 18 RISK WBC.DNA+CFDNA QL: NEGATIVE
GA EST FROM CONCEPTION DATE: NORMAL D
GESTATIONAL AGE > 9:: YES
LAB DIRECTOR NAME PROVIDER: NORMAL
LAB DIRECTOR NAME PROVIDER: NORMAL
LABORATORY COMMENT REPORT: NORMAL
LIMITATIONS OF THE TEST: NORMAL
NEGATIVE PREDICTIVE VALUE: NORMAL
NOTE: NORMAL
PERFORMANCE CHARACTERISTICS: NORMAL
POSITIVE PREDICTIVE VALUE: NORMAL
REF LAB TEST METHOD: NORMAL
TEST PERFORMANCE INFO SPEC: NORMAL
TRIOSOMY 16: NOT DETECTED
TRISOMY 22: NOT DETECTED

## 2021-01-19 ENCOUNTER — ROUTINE PRENATAL (OUTPATIENT)
Dept: OBSTETRICS AND GYNECOLOGY | Facility: CLINIC | Age: 36
End: 2021-01-19

## 2021-01-19 VITALS — WEIGHT: 174.3 LBS | SYSTOLIC BLOOD PRESSURE: 126 MMHG | BODY MASS INDEX: 26.5 KG/M2 | DIASTOLIC BLOOD PRESSURE: 70 MMHG

## 2021-01-19 DIAGNOSIS — O44.40 LOW-LYING PLACENTA: ICD-10-CM

## 2021-01-19 DIAGNOSIS — Z34.90 PREGNANCY, UNSPECIFIED GESTATIONAL AGE: ICD-10-CM

## 2021-01-19 DIAGNOSIS — O30.041 DICHORIONIC DIAMNIOTIC TWIN PREGNANCY IN FIRST TRIMESTER: ICD-10-CM

## 2021-01-19 DIAGNOSIS — O30.042 DICHORIONIC DIAMNIOTIC TWIN PREGNANCY IN SECOND TRIMESTER: Primary | ICD-10-CM

## 2021-01-19 LAB
EXPIRATION DATE: 0
GLUCOSE UR STRIP-MCNC: NEGATIVE MG/DL
Lab: 0
PROT UR STRIP-MCNC: NEGATIVE MG/DL

## 2021-01-19 PROCEDURE — 0502F SUBSEQUENT PRENATAL CARE: CPT | Performed by: OBSTETRICS & GYNECOLOGY

## 2021-01-19 NOTE — PROGRESS NOTES
OB FOLLOW UP    Jaci Armstrong is a 35 y.o.  15w2d patient being seen today for her obstetrical follow up visit. Patient reports intermittent nausea and food aversions. Zofran helps a little. Fetal positions transverse (A) and breech (B). Marginal previa noted. Patient with complaint of increased vaginal discharge; described as clear with slight yellow tint. Denies odor or itch.    Her prenatal care is complicated by (and status): di-di twin pregnancy, frozen transfer, bicornuate uterus, endometriosis, marginal placenta previa    ROS -   Contractions: mild cramping x2 in the past 2 weeks   problems: slight constipation; colace helps  GI problems: intermittent nausea  Bleeding or Leaking: no  Fetal Movement: present      Current Outpatient Medications:   •  estradiol (ESTRACE) 2 MG tablet, , Disp: , Rfl:   •  ondansetron ODT (Zofran ODT) 4 MG disintegrating tablet, Place 1 tablet on the tongue Every 8 (Eight) Hours As Needed for Nausea or Vomiting., Disp: 20 tablet, Rfl: 0  •  oseltamivir (TAMIFLU) 75 MG capsule, Take 1 capsule by mouth 2 (Two) Times a Day., Disp: 10 capsule, Rfl: 0  •  PredniSONE (DELTASONE) 10 MG (21) tablet pack, Daily taper- 6/5/4/3/2/, Disp: 21 tablet, Rfl: 0  •  Prenat w/o B-NH-Iotacle-FA-DHA (PNV-DHA PO), Take  by mouth., Disp: , Rfl:   •  Progesterone 200 MG suppository, Insert  into the vagina., Disp: , Rfl:   •  sertraline (ZOLOFT) 50 MG tablet, Take 1 tablet by mouth Daily., Disp: 30 tablet, Rfl: 12    /70   Wt 79.1 kg (174 lb 4.8 oz)   BMI 26.50 kg/m²     FHT:  Positive x2 BPM    Uterine Size: size greater than dates   Presentations: unsure        Uterus-nontender   Assessment    1. Pregnancy at 15w2d  2. Fetal status reassuring     Problem List Items Addressed This Visit        Other    Dichorionic diamniotic twin pregnancy in first trimester    Low-lying placenta      Other Visit Diagnoses     Dichorionic diamniotic twin pregnancy in second trimester    -  Primary     Relevant Orders    US OB Follow Up Transabdominal Approach    US Ob 14 + Weeks Single or First Gestation    Pregnancy, unspecified gestational age        Relevant Orders    POC Glucose, Urine, Qualitative, Dipstick (Completed)    POC Protein, Urine, Qualitative, Dipstick (Completed)          Plan    1. P/patient return in 4 weeks  2. Prenatal teaching done and patient questions were answered  3. Anatomy scan in 4 weeks    Negro Philip MD  01/19/2021

## 2021-02-19 ENCOUNTER — ROUTINE PRENATAL (OUTPATIENT)
Dept: OBSTETRICS AND GYNECOLOGY | Facility: CLINIC | Age: 36
End: 2021-02-19

## 2021-02-19 VITALS — BODY MASS INDEX: 27.49 KG/M2 | SYSTOLIC BLOOD PRESSURE: 130 MMHG | DIASTOLIC BLOOD PRESSURE: 58 MMHG | WEIGHT: 180.8 LBS

## 2021-02-19 DIAGNOSIS — O30.041 DICHORIONIC DIAMNIOTIC TWIN PREGNANCY IN FIRST TRIMESTER: ICD-10-CM

## 2021-02-19 DIAGNOSIS — Z34.90 PREGNANCY, UNSPECIFIED GESTATIONAL AGE: Primary | ICD-10-CM

## 2021-02-19 LAB
EXPIRATION DATE: 0
GLUCOSE UR STRIP-MCNC: NEGATIVE MG/DL
Lab: 0
PROT UR STRIP-MCNC: NEGATIVE MG/DL

## 2021-02-19 PROCEDURE — 0502F SUBSEQUENT PRENATAL CARE: CPT | Performed by: OBSTETRICS & GYNECOLOGY

## 2021-02-19 NOTE — PROGRESS NOTES
OB FOLLOW UP    Jaci Armstrong is a 35 y.o.  19w5d patient being seen today for her obstetrical follow up visit. Patient reports lightheadedness, nausea, fatigue (for 10-30min at a time, 1x weekly in past month). Eating every couple of hours and staying well hydrated.    Her prenatal care is complicated by (and status): di-di twin pregnancy, bicornuate uterus, frozen transfer    ROS -   Contractions: no   problems: no  GI problems: yes - nausea  Bleeding or Leaking: no  Fetal Movement: present      Current Outpatient Medications:   •  estradiol (ESTRACE) 2 MG tablet, , Disp: , Rfl:   •  ondansetron ODT (Zofran ODT) 4 MG disintegrating tablet, Place 1 tablet on the tongue Every 8 (Eight) Hours As Needed for Nausea or Vomiting., Disp: 20 tablet, Rfl: 0  •  oseltamivir (TAMIFLU) 75 MG capsule, Take 1 capsule by mouth 2 (Two) Times a Day., Disp: 10 capsule, Rfl: 0  •  PredniSONE (DELTASONE) 10 MG (21) tablet pack, Daily taper- 6/5/4/3//, Disp: 21 tablet, Rfl: 0  •  Prenat w/o T-XP-Qqymkbt-FA-DHA (PNV-DHA PO), Take  by mouth., Disp: , Rfl:   •  Progesterone 200 MG suppository, Insert  into the vagina., Disp: , Rfl:   •  sertraline (ZOLOFT) 50 MG tablet, Take 1 tablet by mouth Daily., Disp: 30 tablet, Rfl: 12    /58   Wt 82 kg (180 lb 12.8 oz)   BMI 27.49 kg/m²     FHT:  Present x2     Uterine Size: size greater than dates   Presentations: unsure        Uterus-nontender   Assessment    1. Pregnancy at 19w5d  2. Fetal status reassuring     Problem List Items Addressed This Visit        Gravid and     Dichorionic diamniotic twin pregnancy in first trimester    Relevant Orders    US Ob Follow Up Transabdominal Approach      Other Visit Diagnoses     Pregnancy, unspecified gestational age    -  Primary    Relevant Orders    POC Glucose, Urine, Qualitative, Dipstick (Completed)    POC Protein, Urine, Qualitative, Dipstick (Completed)          Plan    1. P/patient return in 4 weeks  2. Prenatal  teaching done and patient questions were answered  3. Di Di twins at 20 weeks    Negro Philip MD  02/19/2021

## 2021-03-19 ENCOUNTER — ROUTINE PRENATAL (OUTPATIENT)
Dept: OBSTETRICS AND GYNECOLOGY | Facility: CLINIC | Age: 36
End: 2021-03-19

## 2021-03-19 VITALS — WEIGHT: 190.6 LBS | SYSTOLIC BLOOD PRESSURE: 124 MMHG | DIASTOLIC BLOOD PRESSURE: 62 MMHG | BODY MASS INDEX: 28.98 KG/M2

## 2021-03-19 DIAGNOSIS — O44.40 LOW-LYING PLACENTA: ICD-10-CM

## 2021-03-19 DIAGNOSIS — Z34.90 PREGNANCY, UNSPECIFIED GESTATIONAL AGE: Primary | ICD-10-CM

## 2021-03-19 DIAGNOSIS — O30.049 TWIN PREGNANCY, DICHORIONIC/DIAMNIOTIC, UNSPECIFIED TRIMESTER: ICD-10-CM

## 2021-03-19 PROBLEM — O30.041 DICHORIONIC DIAMNIOTIC TWIN PREGNANCY IN FIRST TRIMESTER: Status: RESOLVED | Noted: 2020-12-08 | Resolved: 2021-03-19

## 2021-03-19 LAB
EXPIRATION DATE: 0
GLUCOSE UR STRIP-MCNC: NEGATIVE MG/DL
Lab: 0
PROT UR STRIP-MCNC: NEGATIVE MG/DL

## 2021-03-19 PROCEDURE — 0502F SUBSEQUENT PRENATAL CARE: CPT | Performed by: NURSE PRACTITIONER

## 2021-03-19 NOTE — PROGRESS NOTES
OB FOLLOW UP    Jaci Armstrong is a 35 y.o.  23w5d patient being seen today for her obstetrical follow up visit. Patient reports bilateral lower and upper extremity edema - trace. U/S today. Review of 28wk visit.     Her prenatal care is complicated by (and status): di-di twin, bicornuate uterus, endometriosis, frozen transfer    ROS -   Contractions: no   problems: no  GI problems: no  Bleeding or Leaking: no  Fetal Movement: present      Current Outpatient Medications:   •  estradiol (ESTRACE) 2 MG tablet, , Disp: , Rfl:   •  ondansetron ODT (Zofran ODT) 4 MG disintegrating tablet, Place 1 tablet on the tongue Every 8 (Eight) Hours As Needed for Nausea or Vomiting., Disp: 20 tablet, Rfl: 0  •  oseltamivir (TAMIFLU) 75 MG capsule, Take 1 capsule by mouth 2 (Two) Times a Day., Disp: 10 capsule, Rfl: 0  •  PredniSONE (DELTASONE) 10 MG (21) tablet pack, Daily taper- 6///3/, Disp: 21 tablet, Rfl: 0  •  Prenat w/o F-TA-Zwpinij-FA-DHA (PNV-DHA PO), Take  by mouth., Disp: , Rfl:   •  Progesterone 200 MG suppository, Insert  into the vagina., Disp: , Rfl:   •  sertraline (ZOLOFT) 50 MG tablet, Take 1 tablet by mouth Daily., Disp: 30 tablet, Rfl: 12    /62   Wt 86.5 kg (190 lb 9.6 oz)   BMI 28.98 kg/m²     FHT:  posBPM    Uterine Size: consistent with twins   Presentations:         Uterus-nontender   Assessment    1. Pregnancy at 23w5d  2. Fetal status reassuring     Problem List Items Addressed This Visit        Gravid and     Low-lying placenta    Twin pregnancy, dichorionic/diamniotic, unspecified trimester    Relevant Orders    US Ob Follow Up Transabdominal Approach      Other Visit Diagnoses     Pregnancy, unspecified gestational age    -  Primary    Relevant Orders    POC Glucose, Urine, Qualitative, Dipstick (Completed)    POC Protein, Urine, Qualitative, Dipstick (Completed)          Plan    1. P/patient return in 4 weeks  2. Prenatal teaching done and patient questions were  answered  3. Ultrasound today revealed normal growth, all anatomy that was viewed appeared normal, however, baby A profile still could not be fully visualized.  Repeat at next visit  4. MULU precautions reviewed    Kelin Torres, APRN  03/19/2021

## 2021-04-14 ENCOUNTER — ROUTINE PRENATAL (OUTPATIENT)
Dept: OBSTETRICS AND GYNECOLOGY | Facility: CLINIC | Age: 36
End: 2021-04-14

## 2021-04-14 VITALS — WEIGHT: 199 LBS | SYSTOLIC BLOOD PRESSURE: 126 MMHG | DIASTOLIC BLOOD PRESSURE: 76 MMHG | BODY MASS INDEX: 30.26 KG/M2

## 2021-04-14 DIAGNOSIS — Z3A.27 27 WEEKS GESTATION OF PREGNANCY: Primary | ICD-10-CM

## 2021-04-14 DIAGNOSIS — O30.049 TWIN PREGNANCY, DICHORIONIC/DIAMNIOTIC, UNSPECIFIED TRIMESTER: ICD-10-CM

## 2021-04-14 LAB
GLUCOSE UR STRIP-MCNC: NEGATIVE MG/DL
PROT UR STRIP-MCNC: NEGATIVE MG/DL

## 2021-04-14 PROCEDURE — 76816 OB US FOLLOW-UP PER FETUS: CPT | Performed by: OBSTETRICS & GYNECOLOGY

## 2021-04-14 PROCEDURE — 0502F SUBSEQUENT PRENATAL CARE: CPT | Performed by: OBSTETRICS & GYNECOLOGY

## 2021-04-14 NOTE — PROGRESS NOTES
OB FOLLOW UP  28 week labs    Jaci Armstrong is a 35 y.o.  27w3d patient being seen today for her obstetrical follow up visit. Patient reports backache.     Her prenatal care is complicated by (and status) :   di-di twins  bicornuate uterus, endometriosis  frozen transfer 10/22/2020  MBT O positive  placenta previa with concern for vasa previa on  U/S; resolved     ROS -   Contractions BH CTX's   problems denies, some bladder leakage   GI problems heartburn - tums - may try Pepcid    Bleeding or Leaking denies   Fetal Movement : she is feeling one baby (A) more than the other (B)      Current Outpatient Medications:   •  Prenat w/o S-PD-Vjlaxgc-FA-DHA (PNV-DHA PO), Take  by mouth., Disp: , Rfl:   •  estradiol (ESTRACE) 2 MG tablet, , Disp: , Rfl:   •  ondansetron ODT (Zofran ODT) 4 MG disintegrating tablet, Place 1 tablet on the tongue Every 8 (Eight) Hours As Needed for Nausea or Vomiting., Disp: 20 tablet, Rfl: 0  •  oseltamivir (TAMIFLU) 75 MG capsule, Take 1 capsule by mouth 2 (Two) Times a Day., Disp: 10 capsule, Rfl: 0  •  PredniSONE (DELTASONE) 10 MG (21) tablet pack, Daily taper- 6/5/4/3/2/1, Disp: 21 tablet, Rfl: 0  •  Progesterone 200 MG suppository, Insert  into the vagina., Disp: , Rfl:   •  sertraline (ZOLOFT) 50 MG tablet, Take 1 tablet by mouth Daily., Disp: 30 tablet, Rfl: 12    /76   Wt 90.3 kg (199 lb)   BMI 30.26 kg/m²     FHT:    + X 2   Uterine Size: size greater than dates   Presentations: unsure        Uterus-nontender   Assessment    1. Pregnancy at 27w3d  2. Fetal status reassuring     Problem List Items Addressed This Visit        Gravid and     Twin pregnancy, dichorionic/diamniotic, unspecified trimester    Relevant Orders    CBC (No Diff)    Antibody Screen    Gestational Screen 1 Hr ([facility])    POC Urinalysis Dipstick (Completed)      Other Visit Diagnoses     27 weeks gestation of pregnancy    -  Primary    Relevant Orders    CBC (No Diff)     Antibody Screen    Gestational Screen 1 Hr ([facility])    POC Urinalysis Dipstick (Completed)          Plan    1. P/patient return in 4 weeks   2. Prenatal teaching done and patient questions were answered  3. Weekly BPP's after 32 weeks    Negro Philip MD  04/14/2021

## 2021-04-15 LAB
BLD GP AB SCN SERPL QL: NEGATIVE
ERYTHROCYTE [DISTWIDTH] IN BLOOD BY AUTOMATED COUNT: 12.4 % (ref 12.3–15.4)
GLUCOSE 1H P 50 G GLC PO SERPL-MCNC: 83 MG/DL (ref 65–139)
HCT VFR BLD AUTO: 30.9 % (ref 34–46.6)
HGB BLD-MCNC: 10.7 G/DL (ref 12–15.9)
MCH RBC QN AUTO: 29.7 PG (ref 26.6–33)
MCHC RBC AUTO-ENTMCNC: 34.6 G/DL (ref 31.5–35.7)
MCV RBC AUTO: 85.8 FL (ref 79–97)
PLATELET # BLD AUTO: 229 10*3/MM3 (ref 140–450)
RBC # BLD AUTO: 3.6 10*6/MM3 (ref 3.77–5.28)
WBC # BLD AUTO: 8.52 10*3/MM3 (ref 3.4–10.8)

## 2021-04-30 ENCOUNTER — TELEPHONE (OUTPATIENT)
Dept: OBSTETRICS AND GYNECOLOGY | Facility: CLINIC | Age: 36
End: 2021-04-30

## 2021-04-30 NOTE — TELEPHONE ENCOUNTER
Patient states she is having sinus issues, is having green mucus and would like to discuss, is having a lot of sinus pressure

## 2021-05-11 ENCOUNTER — ROUTINE PRENATAL (OUTPATIENT)
Dept: OBSTETRICS AND GYNECOLOGY | Facility: CLINIC | Age: 36
End: 2021-05-11

## 2021-05-11 VITALS — DIASTOLIC BLOOD PRESSURE: 62 MMHG | SYSTOLIC BLOOD PRESSURE: 112 MMHG | WEIGHT: 203 LBS | BODY MASS INDEX: 30.87 KG/M2

## 2021-05-11 DIAGNOSIS — Z3A.31 31 WEEKS GESTATION OF PREGNANCY: Primary | ICD-10-CM

## 2021-05-11 DIAGNOSIS — O30.049 TWIN PREGNANCY, DICHORIONIC/DIAMNIOTIC, UNSPECIFIED TRIMESTER: ICD-10-CM

## 2021-05-11 PROBLEM — O44.40 LOW-LYING PLACENTA: Status: RESOLVED | Noted: 2020-12-22 | Resolved: 2021-05-11

## 2021-05-11 PROBLEM — R10.2 PELVIC PAIN: Status: RESOLVED | Noted: 2020-10-07 | Resolved: 2021-05-11

## 2021-05-11 LAB
EXPIRATION DATE: NORMAL
GLUCOSE UR STRIP-MCNC: NEGATIVE MG/DL
Lab: NORMAL
PROT UR STRIP-MCNC: NEGATIVE MG/DL

## 2021-05-11 PROCEDURE — 0502F SUBSEQUENT PRENATAL CARE: CPT | Performed by: NURSE PRACTITIONER

## 2021-05-11 NOTE — PROGRESS NOTES
OB FOLLOW UP  CC- Here for care of pregnancy        Jaci Armstrong is a 35 y.o.  31w2d patient being seen today for her obstetrical follow up visit. Patient reports swelling and occasional rojas murcia.  She also c/o LLQ pain.     Swelling feet, ankles, hands and face.  LLQ pain began yesterday morning.  She says that pain has been constant, and typically stay at about an 8/10 on the pain scale.  It will sometimes lessen, but will always be worse when she is up and active.  The same pain occurred a while back, but on the right side but resolved.      She denies LOF, vaginal spotting, headaches or vision changes.  She reports adequate fetal movements, >10 movements in 10 hours.    Her prenatal care is complicated by (and status) :    Patient Active Problem List   Diagnosis   • Endometriosis   • Twin pregnancy, dichorionic/diamniotic, unspecified trimester     Ultrasound Today: Yes.   I have personally evaluated the U/S and agree with the findings. Sarahy Mcmanus, APRN    ROS -   Patient Reports : swelling, rojas murcia and LLQ pain  Patient Denies: Loss of Fluid, Vaginal Spotting, Vision Changes, Headaches, Nausea , Vomiting  and Epigastric pain  Fetal Movement : >10 movements in 10 hours  All other systems reviewed and are negative.       The additional following portions of the patient's history were reviewed and updated as appropriate: allergies, current medications, past family history, past medical history, past social history, past surgical history and problem list.    I have reviewed and agree with the HPI, ROS, and historical information as entered above. Sarahy Mcmanus, ALESSANDRO    /62   Wt 92.1 kg (203 lb)   BMI 30.87 kg/m²       EXAM:     FHT:  wnl x 2 BPM   Uterine Size: see US  Pelvic Exam: deferred    Urine glucose/protein: See prenatal flowsheet       Assessment and Plan    Problem List Items Addressed This Visit        Gravid and     Twin pregnancy, dichorionic/diamniotic,  unspecified trimester    Relevant Orders    POC Protein, Urine, Qualitative, Dipstick (Completed)    POC Glucose, Urine, Qualitative, Dipstick (Completed)    [facility]      Other Visit Diagnoses     31 weeks gestation of pregnancy    -  Primary    Relevant Orders    POC Protein, Urine, Qualitative, Dipstick (Completed)    POC Glucose, Urine, Qualitative, Dipstick (Completed)          1. Pregnancy at 31w2d  2. Fetal status reassuring.  Rev daily FMC, MULU prec, preecl prec.  3. Activity and Exercise discussed.  Rev belly band, Tylenol, warm baths.  Return in about 6 days (around 2021) for after PDC.   US today-- baby A- AC 6%,   Baby B- HC 2%.      Sarahy Mcmanus, APRN  2021

## 2021-05-17 ENCOUNTER — ROUTINE PRENATAL (OUTPATIENT)
Dept: OBSTETRICS AND GYNECOLOGY | Facility: CLINIC | Age: 36
End: 2021-05-17

## 2021-05-17 VITALS — SYSTOLIC BLOOD PRESSURE: 130 MMHG | WEIGHT: 204 LBS | BODY MASS INDEX: 31.02 KG/M2 | DIASTOLIC BLOOD PRESSURE: 74 MMHG

## 2021-05-17 DIAGNOSIS — O30.049 TWIN PREGNANCY, DICHORIONIC/DIAMNIOTIC, UNSPECIFIED TRIMESTER: ICD-10-CM

## 2021-05-17 DIAGNOSIS — Z3A.32 32 WEEKS GESTATION OF PREGNANCY: Primary | ICD-10-CM

## 2021-05-17 DIAGNOSIS — O36.5939 SGA (SMALL FOR GESTATIONAL AGE), FETAL, AFFECTING CARE OF MOTHER, ANTEPARTUM, THIRD TRIMESTER, OTHER FETUS: ICD-10-CM

## 2021-05-17 LAB
GLUCOSE UR STRIP-MCNC: NEGATIVE MG/DL
PROT UR STRIP-MCNC: NEGATIVE MG/DL

## 2021-05-17 PROCEDURE — 0502F SUBSEQUENT PRENATAL CARE: CPT | Performed by: OBSTETRICS & GYNECOLOGY

## 2021-05-17 NOTE — PROGRESS NOTES
OB FOLLOW UP    Jaci Armstrong is a 35 y.o.  32w1d patient being seen today for her obstetrical follow up visit, ultrasound completed today. Patient reports she has several questions for Dr. 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 Next PDC appt is this Thursday. Patient reports she is having mild swelling in her hands and her feet.    Her prenatal care is complicated by (and status) :    x1   bicornuate uterus, endometriosis  frozen transfer 10/22/2020  MBT O positive  placenta previa with concern for vasa previa on  U/S; resolved   31 wks--baby A--AC 6%;  baby B--HC 2%---see PDC  Asymmetrical growth delay both babies    ROS -   Contractions BH ctx's     problems denied   GI problems HB, taking Tums   Bleeding or Leaking no  Fetal Movement : Baby B is moving more than Baby A      Current Outpatient Medications:   •  Prenat w/o A-HJ-Qutfika-FA-DHA (PNV-DHA PO), Take  by mouth., Disp: , Rfl:   •  estradiol (ESTRACE) 2 MG tablet, , Disp: , Rfl:   •  ondansetron ODT (Zofran ODT) 4 MG disintegrating tablet, Place 1 tablet on the tongue Every 8 (Eight) Hours As Needed for Nausea or Vomiting., Disp: 20 tablet, Rfl: 0  •  oseltamivir (TAMIFLU) 75 MG capsule, Take 1 capsule by mouth 2 (Two) Times a Day., Disp: 10 capsule, Rfl: 0  •  PredniSONE (DELTASONE) 10 MG (21) tablet pack, Daily taper- 6/5/4/3/2/, Disp: 21 tablet, Rfl: 0  •  Progesterone 200 MG suppository, Insert  into the vagina., Disp: , Rfl:   •  sertraline (ZOLOFT) 50 MG tablet, Take 1 tablet by mouth Daily., Disp: 30 tablet, Rfl: 12    /74   Wt 92.5 kg (204 lb)   BMI 31.02 kg/m²     FHT:  Positive x2   Uterine Size: size greater than dates   Presentations: unsure        Uterus-nontender   Assessment    1. Pregnancy at 32w1d  2. Fetal status reassuring     Problem List Items Addressed This Visit        Gravid and     Twin pregnancy, dichorionic/diamniotic, unspecified trimester       Other    SGA (small for  gestational age), fetal, affecting care of mother, antepartum, third trimester, other fetus    Overview     Baby A 6 percentile AC  Baby B 2 percentile HC             Other Visit Diagnoses     32 weeks gestation of pregnancy    -  Primary    Relevant Orders    POC Urinalysis Dipstick (Completed)          Plan    1. P/patient return in 1 weeks  2. Prenatal teaching done and patient questions were answered  3. Continue weekly BPP's    Negro Philip MD  05/17/2021

## 2021-05-18 ENCOUNTER — TELEPHONE (OUTPATIENT)
Dept: OBSTETRICS AND GYNECOLOGY | Facility: CLINIC | Age: 36
End: 2021-05-18

## 2021-05-20 ENCOUNTER — HOSPITAL ENCOUNTER (OUTPATIENT)
Dept: WOMENS IMAGING | Facility: HOSPITAL | Age: 36
Discharge: HOME | End: 2021-05-20
Admitting: NURSE PRACTITIONER

## 2021-05-20 ENCOUNTER — OFFICE VISIT (OUTPATIENT)
Dept: OBSTETRICS AND GYNECOLOGY | Facility: HOSPITAL | Age: 36
End: 2021-05-20

## 2021-05-20 VITALS
SYSTOLIC BLOOD PRESSURE: 118 MMHG | HEIGHT: 68 IN | DIASTOLIC BLOOD PRESSURE: 66 MMHG | WEIGHT: 203 LBS | BODY MASS INDEX: 30.77 KG/M2

## 2021-05-20 DIAGNOSIS — O30.049 TWIN PREGNANCY, DICHORIONIC/DIAMNIOTIC, UNSPECIFIED TRIMESTER: Primary | ICD-10-CM

## 2021-05-20 DIAGNOSIS — O30.049 TWIN PREGNANCY, DICHORIONIC/DIAMNIOTIC, UNSPECIFIED TRIMESTER: ICD-10-CM

## 2021-05-20 DIAGNOSIS — Q51.3 BICORNUATE UTERUS: ICD-10-CM

## 2021-05-20 DIAGNOSIS — O26.849 UTERINE SIZE DATE DISCREPANCY PREGNANCY: ICD-10-CM

## 2021-05-20 PROCEDURE — 76811 OB US DETAILED SNGL FETUS: CPT | Performed by: OBSTETRICS & GYNECOLOGY

## 2021-05-20 PROCEDURE — 76812 OB US DETAILED ADDL FETUS: CPT | Performed by: OBSTETRICS & GYNECOLOGY

## 2021-05-20 PROCEDURE — 76812 OB US DETAILED ADDL FETUS: CPT

## 2021-05-20 PROCEDURE — 76811 OB US DETAILED SNGL FETUS: CPT

## 2021-05-20 NOTE — PROGRESS NOTES
Documentation of the ultasound findings, images, and interpretations will be available in the patient's Viewpoint report located in the Chart Review Imaging tab in [employer].

## 2021-05-21 ENCOUNTER — APPOINTMENT (OUTPATIENT)
Dept: WOMENS IMAGING | Facility: HOSPITAL | Age: 36
End: 2021-05-21

## 2021-05-24 ENCOUNTER — ROUTINE PRENATAL (OUTPATIENT)
Dept: OBSTETRICS AND GYNECOLOGY | Facility: CLINIC | Age: 36
End: 2021-05-24

## 2021-05-24 VITALS — WEIGHT: 205 LBS | DIASTOLIC BLOOD PRESSURE: 78 MMHG | SYSTOLIC BLOOD PRESSURE: 126 MMHG | BODY MASS INDEX: 31.63 KG/M2

## 2021-05-24 DIAGNOSIS — O30.049 TWIN PREGNANCY, DICHORIONIC/DIAMNIOTIC, UNSPECIFIED TRIMESTER: Primary | ICD-10-CM

## 2021-05-24 DIAGNOSIS — Z3A.33 33 WEEKS GESTATION OF PREGNANCY: ICD-10-CM

## 2021-05-24 LAB
GLUCOSE UR STRIP-MCNC: NEGATIVE MG/DL
PROT UR STRIP-MCNC: NEGATIVE MG/DL

## 2021-05-24 PROCEDURE — 90715 TDAP VACCINE 7 YRS/> IM: CPT | Performed by: OBSTETRICS & GYNECOLOGY

## 2021-05-24 PROCEDURE — 0502F SUBSEQUENT PRENATAL CARE: CPT | Performed by: OBSTETRICS & GYNECOLOGY

## 2021-05-24 PROCEDURE — 90471 IMMUNIZATION ADMIN: CPT | Performed by: OBSTETRICS & GYNECOLOGY

## 2021-05-24 NOTE — PROGRESS NOTES
OB FOLLOW UP   US twins    Jaci Armstrong is a 35 y.o.  33w1d patient being seen today for her obstetrical follow up visit. Patient reports carpal tunnel symptoms. Both hands, just started, numbness tingling tightness swelling. Pt driving for work 45 min - 2 1/5 hours     Her prenatal care is complicated by (and status) : bicornuate uterus, endometriosis  frozen transfer 10/22/2020  MBT O positive  placenta previa with concern for vasa previa on  U/S; resolved   31 wks--baby A--AC 6%;  baby B--HC 2%---PDC 21      ROS -   Contractions BH -    problems denies  GI problems denies  Bleeding or Leaking denies  Fetal Movement : good      Current Outpatient Medications:   •  ondansetron ODT (Zofran ODT) 4 MG disintegrating tablet, Place 1 tablet on the tongue Every 8 (Eight) Hours As Needed for Nausea or Vomiting., Disp: 20 tablet, Rfl: 0  •  Prenat w/o O-NJ-Ojkbbhc-FA-DHA (PNV-DHA PO), Take  by mouth., Disp: , Rfl:   •  sertraline (ZOLOFT) 50 MG tablet, Take 1 tablet by mouth Daily., Disp: 30 tablet, Rfl: 12    /78   Wt 93 kg (205 lb)   BMI 31.63 kg/m²     FHT:  + X 2   Uterine Size: size greater than dates   Presentations: unsure        Uterus-nontender   Assessment    1. Pregnancy at 33w1d  2. Fetal status reassuring     Problem List Items Addressed This Visit        Gravid and     Twin pregnancy, dichorionic/diamniotic, unspecified trimester - Primary    Relevant Orders    US Ob Limited 1 + Fetuses    POC Urinalysis Dipstick (Completed)    US Fetal Biophysical Profile;Without Non-Stress Testing    US fetal biophysical wo nonstress testing ea add gest    Tdap Vaccine Greater Than or Equal To 6yo IM (Completed)      Other Visit Diagnoses     33 weeks gestation of pregnancy        Relevant Orders    POC Urinalysis Dipstick (Completed)    US Fetal Biophysical Profile;Without Non-Stress Testing    US fetal biophysical wo nonstress testing ea add gest    Tdap Vaccine Greater Than or  Equal To 6yo IM (Completed)          Plan    1. P/patient return in 8 days  2. Prenatal teaching done and patient questions were answered  3. Continue weekly BPP's for sudhakar Philip MD  05/24/2021

## 2021-06-02 ENCOUNTER — ROUTINE PRENATAL (OUTPATIENT)
Dept: OBSTETRICS AND GYNECOLOGY | Facility: CLINIC | Age: 36
End: 2021-06-02

## 2021-06-02 VITALS — WEIGHT: 206.8 LBS | DIASTOLIC BLOOD PRESSURE: 78 MMHG | SYSTOLIC BLOOD PRESSURE: 120 MMHG | BODY MASS INDEX: 31.91 KG/M2

## 2021-06-02 DIAGNOSIS — O36.5939 SGA (SMALL FOR GESTATIONAL AGE), FETAL, AFFECTING CARE OF MOTHER, ANTEPARTUM, THIRD TRIMESTER, OTHER FETUS: ICD-10-CM

## 2021-06-02 DIAGNOSIS — O30.049 TWIN PREGNANCY, DICHORIONIC/DIAMNIOTIC, UNSPECIFIED TRIMESTER: ICD-10-CM

## 2021-06-02 DIAGNOSIS — Z3A.34 34 WEEKS GESTATION OF PREGNANCY: Primary | ICD-10-CM

## 2021-06-02 LAB
EXPIRATION DATE: 0
GLUCOSE UR STRIP-MCNC: NEGATIVE MG/DL
Lab: 0
PROT UR STRIP-MCNC: NEGATIVE MG/DL

## 2021-06-02 PROCEDURE — 0502F SUBSEQUENT PRENATAL CARE: CPT | Performed by: OBSTETRICS & GYNECOLOGY

## 2021-06-02 NOTE — PROGRESS NOTES
OB FOLLOW UP    Jaci Armstrong is a 35 y.o.  34w3d patient being seen today for her obstetrical follow up visit. Patient reports cramping that occurs once or twice a day lasting for 1-10 minutes then goes away. Tingling in all 10 of her fingers that lasts all day and night. She is having some swelling all day and night in her hands/fingers/feet and ankles. Patient is having some pain on her public bone that last all day, worse when she stands up and gets out of her car, pain level is 3/10, describes as aching pain she has not tried anything for this pain . Patient states she is having [place] Contractions once or twice a day.  Her prenatal care is complicated by (and status) : bicornuate uterus, endometriosis, frozen transfer, placenta previa with concerns for vasa previa that resolved on     ROS -   Contractions:[place]   problems:none  GI problems:none  Bleeding or Leaking:none  Fetal Movement : Positive      Current Outpatient Medications:   •  ondansetron ODT (Zofran ODT) 4 MG disintegrating tablet, Place 1 tablet on the tongue Every 8 (Eight) Hours As Needed for Nausea or Vomiting., Disp: 20 tablet, Rfl: 0  •  Prenat w/o E-KC-Eobiioc-FA-DHA (PNV-DHA PO), Take  by mouth., Disp: , Rfl:   •  sertraline (ZOLOFT) 50 MG tablet, Take 1 tablet by mouth Daily., Disp: 30 tablet, Rfl: 12    There were no vitals taken for this visit.    FHT:  + X 2    Uterine Size: size greater than dates   Presentations: unsure        Uterus-nontender   Assessment    1. Pregnancy at 34w3d  2. Fetal status reassuring     Problem List Items Addressed This Visit     None          Plan    1. P/patient return in 1 weeks  2. Prenatal teaching done and patient questions were answered  3. BPP and estimated fetal weight next week    Holden Mccullough MA  2021

## 2021-06-09 ENCOUNTER — ROUTINE PRENATAL (OUTPATIENT)
Dept: OBSTETRICS AND GYNECOLOGY | Facility: CLINIC | Age: 36
End: 2021-06-09

## 2021-06-09 VITALS — WEIGHT: 210 LBS | BODY MASS INDEX: 32.41 KG/M2 | SYSTOLIC BLOOD PRESSURE: 130 MMHG | DIASTOLIC BLOOD PRESSURE: 70 MMHG

## 2021-06-09 DIAGNOSIS — Z3A.35 35 WEEKS GESTATION OF PREGNANCY: ICD-10-CM

## 2021-06-09 DIAGNOSIS — O36.5939 SGA (SMALL FOR GESTATIONAL AGE), FETAL, AFFECTING CARE OF MOTHER, ANTEPARTUM, THIRD TRIMESTER, OTHER FETUS: ICD-10-CM

## 2021-06-09 DIAGNOSIS — O30.049 TWIN PREGNANCY, DICHORIONIC/DIAMNIOTIC, UNSPECIFIED TRIMESTER: ICD-10-CM

## 2021-06-09 DIAGNOSIS — O30.043 DICHORIONIC DIAMNIOTIC TWIN PREGNANCY IN THIRD TRIMESTER: Primary | ICD-10-CM

## 2021-06-09 DIAGNOSIS — O13.3 GESTATIONAL HYPERTENSION, THIRD TRIMESTER: ICD-10-CM

## 2021-06-09 LAB
GLUCOSE UR STRIP-MCNC: ABNORMAL MG/DL
PROT UR STRIP-MCNC: NEGATIVE MG/DL

## 2021-06-09 PROCEDURE — 0502F SUBSEQUENT PRENATAL CARE: CPT | Performed by: OBSTETRICS & GYNECOLOGY

## 2021-06-09 NOTE — PROGRESS NOTES
OB FOLLOW UP    Jaci Armstrong is a 35 y.o.  35w3d patient being seen today for her obstetrical follow up visit. Patient reports swelling hands, ankles, carpal tunnel pain worse right wrist     Her prenatal care is complicated by (and status) : bicornuate uterus, endometriosis  frozen transfer 10/22/2020  MBT O positive  placenta previa with concern for vasa previa on  U/S; resolved   31 wks--baby A--AC 6%;  baby B--HC 2%---PDC scan normal   Tdap 2021    ROS -   Contractions - BH   problems denies  GI problems denies  Bleeding or Leaking denies  Fetal Movement : good both babies      Current Outpatient Medications:   •  Prenat w/o B-MF-Yyixcbj-FA-DHA (PNV-DHA PO), Take  by mouth., Disp: , Rfl:   •  sertraline (ZOLOFT) 50 MG tablet, Take 1 tablet by mouth Daily., Disp: 30 tablet, Rfl: 12    /70   Wt 95.3 kg (210 lb)   BMI 32.41 kg/m²     FHT:  Fetal heart tones positive x2     Uterine Size: size greater than dates   Presentations: unsure        Uterus-nontender   Assessment    1. Pregnancy at 35w3d  2. Fetal status reassuring     Problem List Items Addressed This Visit        Gravid and     Twin pregnancy, dichorionic/diamniotic, unspecified trimester    35 weeks gestation of pregnancy    Relevant Orders    POC Urinalysis Dipstick (Completed)    Gestational hypertension, third trimester    Overview     Upper normal blood pressures with edema,  Consider delivery after 37 weeks            Other    SGA (small for gestational age), fetal, affecting care of mother, antepartum, third trimester, other fetus    Overview     Baby A 6 percentile AC  Baby B 2 percentile HC             Other Visit Diagnoses     Dichorionic diamniotic twin pregnancy in third trimester    -  Primary    Relevant Orders    US Fetal Biophysical Profile;Without Non-Stress Testing    US Ob Follow Up Transabdominal Approach each additional gestation    POC Urinalysis Dipstick (Completed)           Plan    1. P/patient return in 4 days  2. Prenatal teaching done and patient questions were answered  3. Start bedrest with bathroom privileges, return to clinic in 4 days for BPP    Negro Philip MD  06/09/2021

## 2021-06-14 ENCOUNTER — ROUTINE PRENATAL (OUTPATIENT)
Dept: OBSTETRICS AND GYNECOLOGY | Facility: CLINIC | Age: 36
End: 2021-06-14

## 2021-06-14 VITALS — SYSTOLIC BLOOD PRESSURE: 122 MMHG | DIASTOLIC BLOOD PRESSURE: 80 MMHG | WEIGHT: 221 LBS | BODY MASS INDEX: 34.1 KG/M2

## 2021-06-14 DIAGNOSIS — Z3A.36 36 WEEKS GESTATION OF PREGNANCY: ICD-10-CM

## 2021-06-14 DIAGNOSIS — O30.043 DICHORIONIC DIAMNIOTIC TWIN PREGNANCY IN THIRD TRIMESTER: Primary | ICD-10-CM

## 2021-06-14 LAB
CLARITY, POC: CLEAR
COLOR UR: YELLOW
GLUCOSE UR STRIP-MCNC: NEGATIVE MG/DL
PROT UR STRIP-MCNC: NEGATIVE MG/DL

## 2021-06-14 PROCEDURE — 0502F SUBSEQUENT PRENATAL CARE: CPT | Performed by: OBSTETRICS & GYNECOLOGY

## 2021-06-14 NOTE — PROGRESS NOTES
OB FOLLOW UP  CC- Here for care of pregnancy        Jaci Armstrong is a 35 y.o.  36w1d patient being seen today for her obstetrical follow up visit. Patient reports cramping and back pain .     Her prenatal care is complicated by (and status) :    Patient Active Problem List   Diagnosis   • Endometriosis   • Twin pregnancy, dichorionic/diamniotic, unspecified trimester   • SGA (small for gestational age), fetal, affecting care of mother, antepartum, third trimester, other fetus   • Bicornuate uterus   • 35 weeks gestation of pregnancy   • Gestational hypertension, third trimester   • Dichorionic diamniotic twin pregnancy in third trimester   • 36 weeks gestation of pregnancy         Flu Status: Already given in current flu season  GBS Status: Done Today  Her Delivery Plan is: Repeat . Needs to be scheduled  Ultrasound Today: Yes.  Findings showed biophysical profile is 8 of 8 x2.  I have personally evaluated the U/S and agree with the findings. Negro Philip MD  BPP's 8 of 8 low normal fluid, repeat BPP's and growth in 3 days  ROS -   Patient Reports : Cramping and back pain  Patient Denies: Loss of Fluid, Vaginal Spotting, Contractions and Epigastric pain  Fetal Movement : normal  All other systems reviewed and are negative.       The additional following portions of the patient's history were reviewed and updated as appropriate: allergies and current medications.    I have reviewed and agree with the HPI, ROS, and historical information as entered above. Negro Philip MD        /80   Wt 100 kg (221 lb)   BMI 34.10 kg/m²     EXAM:     FHT: + X 2 BPM   Uterine Size: size greater than dates  Pelvic Exam: No    Urine glucose/protein: See prenatal flowsheet       Assessment and Plan    Problem List Items Addressed This Visit        Gravid and     Dichorionic diamniotic twin pregnancy in third trimester - Primary    Relevant Orders    US Fetal Biophysical  Profile;Without Non-Stress Testing    US fetal biophysical wo nonstress testing ea add gest    Group B Streptococcus Culture - Swab, Vaginal/Rectum    36 weeks gestation of pregnancy    Relevant Orders    Group B Streptococcus Culture - Swab, Vaginal/Rectum    POC Urinalysis Dipstick (Completed)       for twins at 37 weeks scheduled for  at 1030  Repeat ultrasound for growth and BPP's in 3 days  If indicated could move ultrasound up    1. Pregnancy at 36w1d  2. Fetal status reassuring.   3. Activity and Exercise discussed.  No follow-ups on file.    Negro Philip MD  2021

## 2021-06-17 ENCOUNTER — PREP FOR SURGERY (OUTPATIENT)
Dept: OTHER | Facility: HOSPITAL | Age: 36
End: 2021-06-17

## 2021-06-17 ENCOUNTER — ROUTINE PRENATAL (OUTPATIENT)
Dept: OBSTETRICS AND GYNECOLOGY | Facility: CLINIC | Age: 36
End: 2021-06-17

## 2021-06-17 VITALS — SYSTOLIC BLOOD PRESSURE: 135 MMHG | DIASTOLIC BLOOD PRESSURE: 90 MMHG | BODY MASS INDEX: 32.47 KG/M2 | WEIGHT: 210.4 LBS

## 2021-06-17 DIAGNOSIS — O13.3 GESTATIONAL HYPERTENSION, THIRD TRIMESTER: ICD-10-CM

## 2021-06-17 DIAGNOSIS — O30.049 TWIN PREGNANCY, DICHORIONIC/DIAMNIOTIC, UNSPECIFIED TRIMESTER: ICD-10-CM

## 2021-06-17 DIAGNOSIS — Z34.90 PREGNANCY, UNSPECIFIED GESTATIONAL AGE: Primary | ICD-10-CM

## 2021-06-17 DIAGNOSIS — O41.00X0: ICD-10-CM

## 2021-06-17 DIAGNOSIS — O30.043 DICHORIONIC DIAMNIOTIC TWIN PREGNANCY IN THIRD TRIMESTER: Primary | ICD-10-CM

## 2021-06-17 DIAGNOSIS — O30.043 DICHORIONIC DIAMNIOTIC TWIN PREGNANCY IN THIRD TRIMESTER: ICD-10-CM

## 2021-06-17 PROBLEM — Z3A.36 36 WEEKS GESTATION OF PREGNANCY: Status: RESOLVED | Noted: 2021-06-14 | Resolved: 2021-06-17

## 2021-06-17 PROBLEM — O36.5939 SGA (SMALL FOR GESTATIONAL AGE), FETAL, AFFECTING CARE OF MOTHER, ANTEPARTUM, THIRD TRIMESTER, OTHER FETUS: Status: RESOLVED | Noted: 2021-05-17 | Resolved: 2021-06-17

## 2021-06-17 PROBLEM — Z3A.35 35 WEEKS GESTATION OF PREGNANCY: Status: RESOLVED | Noted: 2021-06-02 | Resolved: 2021-06-17

## 2021-06-17 PROCEDURE — 0502F SUBSEQUENT PRENATAL CARE: CPT | Performed by: OBSTETRICS & GYNECOLOGY

## 2021-06-17 RX ORDER — ACETAMINOPHEN 500 MG
1000 TABLET ORAL ONCE
Status: CANCELLED | OUTPATIENT
Start: 2021-06-17 | End: 2021-06-17

## 2021-06-17 RX ORDER — SODIUM CHLORIDE 0.9 % (FLUSH) 0.9 %
10 SYRINGE (ML) INJECTION AS NEEDED
Status: CANCELLED | OUTPATIENT
Start: 2021-06-17

## 2021-06-17 RX ORDER — OXYTOCIN-SODIUM CHLORIDE 0.9% IV SOLN 30 UNIT/500ML 30-0.9/5 UT/ML-%
650 SOLUTION INTRAVENOUS ONCE
Status: CANCELLED | OUTPATIENT
Start: 2021-06-17 | End: 2021-06-17

## 2021-06-17 RX ORDER — OXYTOCIN-SODIUM CHLORIDE 0.9% IV SOLN 30 UNIT/500ML 30-0.9/5 UT/ML-%
85 SOLUTION INTRAVENOUS ONCE
Status: CANCELLED | OUTPATIENT
Start: 2021-06-17 | End: 2021-06-17

## 2021-06-17 RX ORDER — SODIUM CHLORIDE, SODIUM LACTATE, POTASSIUM CHLORIDE, CALCIUM CHLORIDE 600; 310; 30; 20 MG/100ML; MG/100ML; MG/100ML; MG/100ML
125 INJECTION, SOLUTION INTRAVENOUS CONTINUOUS
Status: CANCELLED | OUTPATIENT
Start: 2021-06-17

## 2021-06-17 RX ORDER — KETOROLAC TROMETHAMINE 30 MG/ML
30 INJECTION, SOLUTION INTRAMUSCULAR; INTRAVENOUS ONCE
Status: CANCELLED | OUTPATIENT
Start: 2021-06-17 | End: 2021-06-17

## 2021-06-17 RX ORDER — SODIUM CHLORIDE 0.9 % (FLUSH) 0.9 %
3 SYRINGE (ML) INJECTION EVERY 12 HOURS SCHEDULED
Status: CANCELLED | OUTPATIENT
Start: 2021-06-17

## 2021-06-17 RX ORDER — TRISODIUM CITRATE DIHYDRATE AND CITRIC ACID MONOHYDRATE 500; 334 MG/5ML; MG/5ML
30 SOLUTION ORAL ONCE
Status: CANCELLED | OUTPATIENT
Start: 2021-06-17 | End: 2021-06-17

## 2021-06-17 RX ORDER — LIDOCAINE HYDROCHLORIDE 10 MG/ML
5 INJECTION, SOLUTION EPIDURAL; INFILTRATION; INTRACAUDAL; PERINEURAL AS NEEDED
Status: CANCELLED | OUTPATIENT
Start: 2021-06-17

## 2021-06-17 NOTE — PROGRESS NOTES
"OB FOLLOW UP    Jaci Armstrong is a 35 y.o.  36w4d patient being seen today for her obstetrical follow up visit. Patient reports that her belly feels as though it is \"tightening up\". Her swelling is a +2 in both lower ertremeties. Patient had an US done today as well. She is scheduled for a  on 2021 and her Covid testing on 2021.0    Her prenatal care is complicated by (and status) :   bicornuate uterus, endometriosis  frozen transfer 10/22/2020  MBT O positive/ CS 6-20@1030,covid @ally 945 -;         ROS -   Contractions-yes   problems none  GI problems none  Bleeding or Leaking none  Fetal Movement : present for both      Current Outpatient Medications:   •  Prenat w/o O-NS-Yrchwjo-FA-DHA (PNV-DHA PO), Take  by mouth., Disp: , Rfl:   •  sertraline (ZOLOFT) 50 MG tablet, Take 1 tablet by mouth Daily., Disp: 30 tablet, Rfl: 12    /90   Wt 95.4 kg (210 lb 6.4 oz)   BMI 32.47 kg/m²     FHT:  + X 2   Uterine Size: size greater than dates   Presentations: unsure        Uterus-nontender   Assessment    1. Pregnancy at 36w4d  2. Fetal status reassuring     Problem List Items Addressed This Visit        Gravid and     Twin pregnancy, dichorionic/diamniotic, unspecified trimester    Gestational hypertension, third trimester    Overview     Upper normal blood pressures with edema,  Consider delivery after 37 weeks         Dichorionic diamniotic twin pregnancy in third trimester    Pregnancy with low amniotic fluid volume and normal size fetus    Overview     BPP is 8 of 8 x2, we will plan to deliver at 37 weeks due to low normal fluid and upper normal blood pressure, today 132/90 (6-17-21)           Other Visit Diagnoses     Pregnancy, unspecified gestational age    -  Primary    Relevant Orders    POC Glucose, Urine, Qualitative, Dipstick (Completed)    POC Protein, Urine, Qualitative, Dipstick (Completed)          Plan  Primary  section at 37 weeks " secondary to low normal fluid and blood pressures 135/90  BPP's today 8 of 8 x2  Growth normal last week   scheduled for 1030 on 2021, PAT and Covid testing on   Negro Philip MD  2021

## 2021-06-18 ENCOUNTER — PRE-ADMISSION TESTING (OUTPATIENT)
Dept: PREADMISSION TESTING | Facility: HOSPITAL | Age: 36
End: 2021-06-18

## 2021-06-18 ENCOUNTER — APPOINTMENT (OUTPATIENT)
Dept: PREADMISSION TESTING | Facility: HOSPITAL | Age: 36
End: 2021-06-18

## 2021-06-18 VITALS — BODY MASS INDEX: 32.28 KG/M2 | WEIGHT: 213.01 LBS | HEIGHT: 68 IN

## 2021-06-18 LAB
ABO GROUP BLD: NORMAL
BLD GP AB SCN SERPL QL: NEGATIVE
DEPRECATED RDW RBC AUTO: 43 FL (ref 37–54)
ERYTHROCYTE [DISTWIDTH] IN BLOOD BY AUTOMATED COUNT: 13.7 % (ref 12.3–15.4)
HCT VFR BLD AUTO: 33.8 % (ref 34–46.6)
HGB BLD-MCNC: 11.1 G/DL (ref 12–15.9)
MCH RBC QN AUTO: 28.7 PG (ref 26.6–33)
MCHC RBC AUTO-ENTMCNC: 32.8 G/DL (ref 31.5–35.7)
MCV RBC AUTO: 87.3 FL (ref 79–97)
PLATELET # BLD AUTO: 181 10*3/MM3 (ref 140–450)
PMV BLD AUTO: 10.3 FL (ref 6–12)
RBC # BLD AUTO: 3.87 10*6/MM3 (ref 3.77–5.28)
RH BLD: POSITIVE
SARS-COV-2 RNA NOSE QL NAA+PROBE: NOT DETECTED
T&S EXPIRATION DATE: NORMAL
WBC # BLD AUTO: 7.01 10*3/MM3 (ref 3.4–10.8)

## 2021-06-18 PROCEDURE — U0004 COV-19 TEST NON-CDC HGH THRU: HCPCS

## 2021-06-18 PROCEDURE — 86850 RBC ANTIBODY SCREEN: CPT | Performed by: OBSTETRICS & GYNECOLOGY

## 2021-06-18 PROCEDURE — C9803 HOPD COVID-19 SPEC COLLECT: HCPCS

## 2021-06-18 PROCEDURE — 86900 BLOOD TYPING SEROLOGIC ABO: CPT | Performed by: OBSTETRICS & GYNECOLOGY

## 2021-06-18 PROCEDURE — 85027 COMPLETE CBC AUTOMATED: CPT | Performed by: OBSTETRICS & GYNECOLOGY

## 2021-06-18 PROCEDURE — 86901 BLOOD TYPING SEROLOGIC RH(D): CPT | Performed by: OBSTETRICS & GYNECOLOGY

## 2021-06-18 RX ORDER — UREA 10 %
140 LOTION (ML) TOPICAL
Status: ON HOLD | COMMUNITY
End: 2021-06-22 | Stop reason: SDUPTHER

## 2021-06-18 NOTE — DISCHARGE INSTRUCTIONS
What to know before your arrive:     -Do not eat, drink or chew gum beginning 8 hours before your scheduled arrival time to the hospital.  Except please drink 20 ounches of Gatorade and complete two hours before your  Given arrival time to the hospital.  If you drink too close to surgery time, your sugery may  Be delayed or cancelled.  Please complete as instructed.    -Do not shave any part of your body including abdomen or pelvic are for two    days before your procedure.   -If you are taking a scheduled medication (insulin, blood pressure medicine,   antibiotics) please consult with your physician whether to take on the day of   surgery.   -Remove all jewelry including rings, wedding bands, and piercing before coming   to the hospital.   -Leave important valuables at home.   -Do not wear dark fingernail polish.   -Please take two Tylenol 500 mg tablets the evening before surgery.   -Bring the following with you to the hospital:    -Picture ID and insurance, Medicare or Medicaid cards    -Co-pay/deductible required by insurance (Cash, Check, Credit Card)    -Copy of living will or power  document (if applicable)    -CPAP mask and tubing, not machine (if applicable)    -Skin prep instructions sheet    What to know the day of procedure:     -Park in the [facility], take elevator for first floor, exit to the right and  proceed through the doors to outside, follow the covered sidewalk to the  entrance of the [facility], follow the hallway and signs to the [facility],  enter the [facility] to your right BEFORE entering the 1720 lobby.  Take the  elevators to the 3rd floor ([address] [facility]).   -Leave unnecessary items in your vehicle, including your suitcase.  Your support  person or a family member can get it for you after your procedure.   -Check in at the reception desk in the lobby of the 3rd floor ([address] [facility]).   -One person may accompany you to the pre-op/recovery area.  Please have  other family  members wait in the waiting room.   -An anesthesiologist will meet with your prior to your procedure.   -After anesthesia has been initiated, one person may accompany you in the  operating room.   -No video cameras are permitted in the operating room; only still cameras,  Please.      What to expect while you are in recovery:     -One person may stay with you while you are in recovery.   -If the baby is stable, he/she may visit to initiate breastfeeding & Kangaroo Care.    THE FOLLOWING INFORMATION WAS PROVIDED TO PATIENT:     SECTION BOOKLET BY [facility]  PAIN MANAGEMENT   RESPIREX    CHLORHEXIDINE GLUCONATE WIPES AND INSTRUCTIONS GIVEN TO PATIENT

## 2021-06-18 NOTE — PAT
Instructed patient to take two Tylenol extra strength (total of 1000 mg) the night before surgery.    Patient instructed to drink 20 ounces (or until full) of Gatorade and it needs to be completed 1 hour before given arrival time for procedure (NO RED Gatorade)    Patient verbalized understanding.    Blood bank bracelet applied to patient during Pre Admission Testing visit.  Patient instructed not to remove from arm until after procedure and they are discharged from the hospital.  Explained to patient that they may shower and get the bracelet wet, but not to immerse under water for longer periods (bathing, swimming, hand dishwashing, etc).  Patient verbalized understanding.

## 2021-06-20 ENCOUNTER — ANESTHESIA EVENT (OUTPATIENT)
Dept: LABOR AND DELIVERY | Facility: HOSPITAL | Age: 36
End: 2021-06-20

## 2021-06-20 ENCOUNTER — ANESTHESIA (OUTPATIENT)
Dept: LABOR AND DELIVERY | Facility: HOSPITAL | Age: 36
End: 2021-06-20

## 2021-06-20 ENCOUNTER — HOSPITAL ENCOUNTER (INPATIENT)
Facility: HOSPITAL | Age: 36
LOS: 2 days | Discharge: HOME | End: 2021-06-22
Attending: OBSTETRICS & GYNECOLOGY | Admitting: OBSTETRICS & GYNECOLOGY

## 2021-06-20 DIAGNOSIS — O30.043 DICHORIONIC DIAMNIOTIC TWIN PREGNANCY IN THIRD TRIMESTER: ICD-10-CM

## 2021-06-20 DIAGNOSIS — Z98.891 S/P C-SECTION: Primary | ICD-10-CM

## 2021-06-20 PROBLEM — O30.049 TWIN PREGNANCY, TWINS DICHORIONIC AND DIAMNIOTIC: Status: ACTIVE | Noted: 2021-06-20

## 2021-06-20 PROCEDURE — 59510 CESAREAN DELIVERY: CPT | Performed by: OBSTETRICS & GYNECOLOGY

## 2021-06-20 PROCEDURE — 25010000002 KETOROLAC TROMETHAMINE PER 15 MG: Performed by: OBSTETRICS & GYNECOLOGY

## 2021-06-20 PROCEDURE — S0260 H&P FOR SURGERY: HCPCS | Performed by: OBSTETRICS & GYNECOLOGY

## 2021-06-20 PROCEDURE — 25010000003 HYDROXYZINE PER 25 MG: Performed by: ANESTHESIOLOGY

## 2021-06-20 PROCEDURE — 25010000003 MORPHINE PER 10 MG: Performed by: ANESTHESIOLOGY

## 2021-06-20 PROCEDURE — 88307 TISSUE EXAM BY PATHOLOGIST: CPT | Performed by: OBSTETRICS & GYNECOLOGY

## 2021-06-20 PROCEDURE — 25010000002 CEFAZOLIN PER 500 MG: Performed by: OBSTETRICS & GYNECOLOGY

## 2021-06-20 PROCEDURE — 25010000002 FENTANYL CITRATE (PF) 50 MCG/ML SOLUTION: Performed by: ANESTHESIOLOGY

## 2021-06-20 PROCEDURE — 25010000002 METOCLOPRAMIDE PER 10 MG: Performed by: ANESTHESIOLOGY

## 2021-06-20 PROCEDURE — 59025 FETAL NON-STRESS TEST: CPT

## 2021-06-20 PROCEDURE — 25010000002 ONDANSETRON PER 1 MG: Performed by: ANESTHESIOLOGY

## 2021-06-20 RX ORDER — KETOROLAC TROMETHAMINE 30 MG/ML
30 INJECTION, SOLUTION INTRAMUSCULAR; INTRAVENOUS ONCE
Status: COMPLETED | OUTPATIENT
Start: 2021-06-20 | End: 2021-06-20

## 2021-06-20 RX ORDER — SIMETHICONE 80 MG
80 TABLET,CHEWABLE ORAL 4 TIMES DAILY PRN
Status: DISCONTINUED | OUTPATIENT
Start: 2021-06-20 | End: 2021-06-22 | Stop reason: HOSPADM

## 2021-06-20 RX ORDER — IBUPROFEN 600 MG/1
600 TABLET ORAL EVERY 6 HOURS
Status: DISCONTINUED | OUTPATIENT
Start: 2021-06-21 | End: 2021-06-22 | Stop reason: HOSPADM

## 2021-06-20 RX ORDER — OXYTOCIN 10 [USP'U]/ML
INJECTION, SOLUTION INTRAMUSCULAR; INTRAVENOUS AS NEEDED
Status: DISCONTINUED | OUTPATIENT
Start: 2021-06-20 | End: 2021-06-20 | Stop reason: SURG

## 2021-06-20 RX ORDER — CALCIUM CARBONATE 200(500)MG
1 TABLET,CHEWABLE ORAL EVERY 4 HOURS PRN
Status: DISCONTINUED | OUTPATIENT
Start: 2021-06-20 | End: 2021-06-22 | Stop reason: HOSPADM

## 2021-06-20 RX ORDER — NALBUPHINE HCL 10 MG/ML
3 AMPUL (ML) INJECTION
Status: CANCELLED | OUTPATIENT
Start: 2021-06-20

## 2021-06-20 RX ORDER — ACETAMINOPHEN 500 MG
1000 TABLET ORAL ONCE
Status: COMPLETED | OUTPATIENT
Start: 2021-06-20 | End: 2021-06-20

## 2021-06-20 RX ORDER — HYDROCORTISONE 25 MG/G
1 CREAM TOPICAL AS NEEDED
Status: DISCONTINUED | OUTPATIENT
Start: 2021-06-20 | End: 2021-06-22 | Stop reason: HOSPADM

## 2021-06-20 RX ORDER — HYDROMORPHONE HYDROCHLORIDE 1 MG/ML
0.5 INJECTION, SOLUTION INTRAMUSCULAR; INTRAVENOUS; SUBCUTANEOUS
Status: CANCELLED | OUTPATIENT
Start: 2021-06-20 | End: 2021-06-21

## 2021-06-20 RX ORDER — CEFAZOLIN SODIUM 2 G/100ML
2 INJECTION, SOLUTION INTRAVENOUS ONCE
Status: COMPLETED | OUTPATIENT
Start: 2021-06-20 | End: 2021-06-20

## 2021-06-20 RX ORDER — ONDANSETRON 2 MG/ML
4 INJECTION INTRAMUSCULAR; INTRAVENOUS EVERY 6 HOURS PRN
Status: DISCONTINUED | OUTPATIENT
Start: 2021-06-20 | End: 2021-06-22 | Stop reason: HOSPADM

## 2021-06-20 RX ORDER — SODIUM CHLORIDE, SODIUM LACTATE, POTASSIUM CHLORIDE, CALCIUM CHLORIDE 600; 310; 30; 20 MG/100ML; MG/100ML; MG/100ML; MG/100ML
INJECTION, SOLUTION INTRAVENOUS CONTINUOUS PRN
Status: DISCONTINUED | OUTPATIENT
Start: 2021-06-20 | End: 2021-06-20 | Stop reason: SURG

## 2021-06-20 RX ORDER — ACETAMINOPHEN 500 MG
1000 TABLET ORAL EVERY 6 HOURS
Status: COMPLETED | OUTPATIENT
Start: 2021-06-20 | End: 2021-06-21

## 2021-06-20 RX ORDER — SODIUM CHLORIDE 0.9 % (FLUSH) 0.9 %
3 SYRINGE (ML) INJECTION EVERY 12 HOURS SCHEDULED
Status: DISCONTINUED | OUTPATIENT
Start: 2021-06-20 | End: 2021-06-20 | Stop reason: HOSPADM

## 2021-06-20 RX ORDER — PROMETHAZINE HYDROCHLORIDE 25 MG/1
25 TABLET ORAL ONCE AS NEEDED
Status: DISCONTINUED | OUTPATIENT
Start: 2021-06-20 | End: 2021-06-22 | Stop reason: HOSPADM

## 2021-06-20 RX ORDER — FENTANYL CITRATE 50 UG/ML
INJECTION, SOLUTION INTRAMUSCULAR; INTRAVENOUS AS NEEDED
Status: DISCONTINUED | OUTPATIENT
Start: 2021-06-20 | End: 2021-06-20 | Stop reason: SURG

## 2021-06-20 RX ORDER — SODIUM CHLORIDE, SODIUM LACTATE, POTASSIUM CHLORIDE, CALCIUM CHLORIDE 600; 310; 30; 20 MG/100ML; MG/100ML; MG/100ML; MG/100ML
125 INJECTION, SOLUTION INTRAVENOUS CONTINUOUS
Status: DISCONTINUED | OUTPATIENT
Start: 2021-06-20 | End: 2021-06-22 | Stop reason: HOSPADM

## 2021-06-20 RX ORDER — LANOLIN
CREAM (ML) TOPICAL
Status: DISCONTINUED | OUTPATIENT
Start: 2021-06-20 | End: 2021-06-22 | Stop reason: HOSPADM

## 2021-06-20 RX ORDER — DOCUSATE SODIUM 100 MG/1
100 CAPSULE, LIQUID FILLED ORAL 2 TIMES DAILY PRN
Status: DISCONTINUED | OUTPATIENT
Start: 2021-06-20 | End: 2021-06-22 | Stop reason: HOSPADM

## 2021-06-20 RX ORDER — MORPHINE SULFATE 0.5 MG/ML
INJECTION, SOLUTION EPIDURAL; INTRATHECAL; INTRAVENOUS AS NEEDED
Status: DISCONTINUED | OUTPATIENT
Start: 2021-06-20 | End: 2021-06-20 | Stop reason: SURG

## 2021-06-20 RX ORDER — PRENATAL VIT/IRON FUM/FOLIC AC 27MG-0.8MG
1 TABLET ORAL DAILY
Status: DISCONTINUED | OUTPATIENT
Start: 2021-06-20 | End: 2021-06-22 | Stop reason: HOSPADM

## 2021-06-20 RX ORDER — BUPIVACAINE HCL/0.9 % NACL/PF 0.125 %
PLASTIC BAG, INJECTION (ML) EPIDURAL AS NEEDED
Status: DISCONTINUED | OUTPATIENT
Start: 2021-06-20 | End: 2021-06-20 | Stop reason: SURG

## 2021-06-20 RX ORDER — METOCLOPRAMIDE 10 MG/1
10 TABLET ORAL ONCE AS NEEDED
Status: DISCONTINUED | OUTPATIENT
Start: 2021-06-20 | End: 2021-06-22 | Stop reason: HOSPADM

## 2021-06-20 RX ORDER — DIPHENHYDRAMINE HCL 25 MG
25 CAPSULE ORAL EVERY 4 HOURS PRN
Status: DISCONTINUED | OUTPATIENT
Start: 2021-06-20 | End: 2021-06-22 | Stop reason: HOSPADM

## 2021-06-20 RX ORDER — TRISODIUM CITRATE DIHYDRATE AND CITRIC ACID MONOHYDRATE 500; 334 MG/5ML; MG/5ML
30 SOLUTION ORAL ONCE
Status: COMPLETED | OUTPATIENT
Start: 2021-06-20 | End: 2021-06-20

## 2021-06-20 RX ORDER — KETOROLAC TROMETHAMINE 15 MG/ML
15 INJECTION, SOLUTION INTRAMUSCULAR; INTRAVENOUS EVERY 6 HOURS
Status: COMPLETED | OUTPATIENT
Start: 2021-06-20 | End: 2021-06-21

## 2021-06-20 RX ORDER — OXYTOCIN-SODIUM CHLORIDE 0.9% IV SOLN 30 UNIT/500ML 30-0.9/5 UT/ML-%
650 SOLUTION INTRAVENOUS ONCE
Status: DISCONTINUED | OUTPATIENT
Start: 2021-06-20 | End: 2021-06-20 | Stop reason: HOSPADM

## 2021-06-20 RX ORDER — ALUMINA, MAGNESIA, AND SIMETHICONE 2400; 2400; 240 MG/30ML; MG/30ML; MG/30ML
15 SUSPENSION ORAL EVERY 4 HOURS PRN
Status: DISCONTINUED | OUTPATIENT
Start: 2021-06-20 | End: 2021-06-22 | Stop reason: HOSPADM

## 2021-06-20 RX ORDER — PROMETHAZINE HYDROCHLORIDE 12.5 MG/1
12.5 SUPPOSITORY RECTAL ONCE AS NEEDED
Status: DISCONTINUED | OUTPATIENT
Start: 2021-06-20 | End: 2021-06-22 | Stop reason: HOSPADM

## 2021-06-20 RX ORDER — OXYCODONE HYDROCHLORIDE 5 MG/1
5 TABLET ORAL EVERY 6 HOURS PRN
Status: DISCONTINUED | OUTPATIENT
Start: 2021-06-20 | End: 2021-06-22 | Stop reason: HOSPADM

## 2021-06-20 RX ORDER — OXYCODONE HYDROCHLORIDE 5 MG/1
10 TABLET ORAL EVERY 6 HOURS PRN
Status: DISCONTINUED | OUTPATIENT
Start: 2021-06-20 | End: 2021-06-22 | Stop reason: HOSPADM

## 2021-06-20 RX ORDER — EPHEDRINE SULFATE 50 MG/ML
INJECTION, SOLUTION INTRAVENOUS AS NEEDED
Status: DISCONTINUED | OUTPATIENT
Start: 2021-06-20 | End: 2021-06-20 | Stop reason: SURG

## 2021-06-20 RX ORDER — FAMOTIDINE 10 MG/ML
INJECTION, SOLUTION INTRAVENOUS AS NEEDED
Status: DISCONTINUED | OUTPATIENT
Start: 2021-06-20 | End: 2021-06-20 | Stop reason: SURG

## 2021-06-20 RX ORDER — BUPIVACAINE HYDROCHLORIDE 7.5 MG/ML
INJECTION, SOLUTION INTRASPINAL AS NEEDED
Status: DISCONTINUED | OUTPATIENT
Start: 2021-06-20 | End: 2021-06-20 | Stop reason: SURG

## 2021-06-20 RX ORDER — SODIUM CHLORIDE 0.9 % (FLUSH) 0.9 %
10 SYRINGE (ML) INJECTION AS NEEDED
Status: DISCONTINUED | OUTPATIENT
Start: 2021-06-20 | End: 2021-06-20 | Stop reason: HOSPADM

## 2021-06-20 RX ORDER — ONDANSETRON 4 MG/1
4 TABLET, FILM COATED ORAL EVERY 6 HOURS PRN
Status: DISCONTINUED | OUTPATIENT
Start: 2021-06-20 | End: 2021-06-22 | Stop reason: HOSPADM

## 2021-06-20 RX ORDER — ONDANSETRON 2 MG/ML
INJECTION INTRAMUSCULAR; INTRAVENOUS AS NEEDED
Status: DISCONTINUED | OUTPATIENT
Start: 2021-06-20 | End: 2021-06-20 | Stop reason: SURG

## 2021-06-20 RX ORDER — ACETAMINOPHEN 325 MG/1
650 TABLET ORAL EVERY 6 HOURS
Status: DISCONTINUED | OUTPATIENT
Start: 2021-06-21 | End: 2021-06-22 | Stop reason: HOSPADM

## 2021-06-20 RX ORDER — HYDROXYZINE HYDROCHLORIDE 50 MG/ML
INJECTION, SOLUTION INTRAMUSCULAR AS NEEDED
Status: DISCONTINUED | OUTPATIENT
Start: 2021-06-20 | End: 2021-06-20 | Stop reason: SURG

## 2021-06-20 RX ORDER — OXYTOCIN-SODIUM CHLORIDE 0.9% IV SOLN 30 UNIT/500ML 30-0.9/5 UT/ML-%
SOLUTION INTRAVENOUS AS NEEDED
Status: DISCONTINUED | OUTPATIENT
Start: 2021-06-20 | End: 2021-06-20 | Stop reason: SURG

## 2021-06-20 RX ORDER — METOCLOPRAMIDE HYDROCHLORIDE 5 MG/ML
INJECTION INTRAMUSCULAR; INTRAVENOUS AS NEEDED
Status: DISCONTINUED | OUTPATIENT
Start: 2021-06-20 | End: 2021-06-20 | Stop reason: SURG

## 2021-06-20 RX ORDER — LIDOCAINE HYDROCHLORIDE 10 MG/ML
5 INJECTION, SOLUTION EPIDURAL; INFILTRATION; INTRACAUDAL; PERINEURAL AS NEEDED
Status: DISCONTINUED | OUTPATIENT
Start: 2021-06-20 | End: 2021-06-20 | Stop reason: HOSPADM

## 2021-06-20 RX ORDER — OXYTOCIN-SODIUM CHLORIDE 0.9% IV SOLN 30 UNIT/500ML 30-0.9/5 UT/ML-%
85 SOLUTION INTRAVENOUS ONCE
Status: COMPLETED | OUTPATIENT
Start: 2021-06-20 | End: 2021-06-20

## 2021-06-20 RX ADMIN — SODIUM CHLORIDE, POTASSIUM CHLORIDE, SODIUM LACTATE AND CALCIUM CHLORIDE: 600; 310; 30; 20 INJECTION, SOLUTION INTRAVENOUS at 09:46

## 2021-06-20 RX ADMIN — OXYTOCIN 500 ML: 10 INJECTION INTRAVENOUS at 10:17

## 2021-06-20 RX ADMIN — EPHEDRINE SULFATE 25 MG: 50 INJECTION, SOLUTION INTRAVENOUS at 11:06

## 2021-06-20 RX ADMIN — Medication 200 MCG: at 10:29

## 2021-06-20 RX ADMIN — FENTANYL CITRATE 20 MCG: 50 INJECTION, SOLUTION INTRAMUSCULAR; INTRAVENOUS at 09:53

## 2021-06-20 RX ADMIN — METOCLOPRAMIDE 10 MG: 5 INJECTION, SOLUTION INTRAMUSCULAR; INTRAVENOUS at 10:52

## 2021-06-20 RX ADMIN — HYDROXYZINE HYDROCHLORIDE 25 MG: 50 INJECTION, SOLUTION INTRAMUSCULAR at 11:06

## 2021-06-20 RX ADMIN — ACETAMINOPHEN 1000 MG: 500 TABLET, FILM COATED ORAL at 09:19

## 2021-06-20 RX ADMIN — SIMETHICONE 80 MG: 80 TABLET, CHEWABLE ORAL at 22:06

## 2021-06-20 RX ADMIN — SIMETHICONE 80 MG: 80 TABLET, CHEWABLE ORAL at 18:03

## 2021-06-20 RX ADMIN — MORPHINE SULFATE 0.2 MG: 0.5 INJECTION, SOLUTION EPIDURAL; INTRATHECAL; INTRAVENOUS at 09:53

## 2021-06-20 RX ADMIN — SODIUM CITRATE AND CITRIC ACID MONOHYDRATE 30 ML: 500; 334 SOLUTION ORAL at 09:42

## 2021-06-20 RX ADMIN — OXYTOCIN 85 ML/HR: 10 INJECTION INTRAVENOUS at 11:17

## 2021-06-20 RX ADMIN — Medication 200 MCG: at 10:12

## 2021-06-20 RX ADMIN — CEFAZOLIN 2 G: 10 INJECTION, POWDER, FOR SOLUTION INTRAVENOUS at 09:18

## 2021-06-20 RX ADMIN — OXYTOCIN 3 UNITS: 10 INJECTION, SOLUTION INTRAMUSCULAR; INTRAVENOUS at 10:23

## 2021-06-20 RX ADMIN — Medication 200 MCG: at 10:02

## 2021-06-20 RX ADMIN — KETOROLAC TROMETHAMINE 15 MG: 15 INJECTION, SOLUTION INTRAMUSCULAR; INTRAVENOUS at 18:03

## 2021-06-20 RX ADMIN — BUPIVACAINE HYDROCHLORIDE IN DEXTROSE 1.7 ML: 7.5 INJECTION, SOLUTION SUBARACHNOID at 09:53

## 2021-06-20 RX ADMIN — OXYCODONE 5 MG: 5 TABLET ORAL at 12:55

## 2021-06-20 RX ADMIN — SODIUM CHLORIDE, POTASSIUM CHLORIDE, SODIUM LACTATE AND CALCIUM CHLORIDE 125 ML/HR: 600; 310; 30; 20 INJECTION, SOLUTION INTRAVENOUS at 08:45

## 2021-06-20 RX ADMIN — OXYTOCIN 4 UNITS: 10 INJECTION, SOLUTION INTRAMUSCULAR; INTRAVENOUS at 10:17

## 2021-06-20 RX ADMIN — ONDANSETRON 4 MG: 2 INJECTION INTRAMUSCULAR; INTRAVENOUS at 09:49

## 2021-06-20 RX ADMIN — SODIUM CHLORIDE, POTASSIUM CHLORIDE, SODIUM LACTATE AND CALCIUM CHLORIDE 2000 ML/HR: 600; 310; 30; 20 INJECTION, SOLUTION INTRAVENOUS at 09:18

## 2021-06-20 RX ADMIN — ACETAMINOPHEN 1000 MG: 500 TABLET, FILM COATED ORAL at 15:57

## 2021-06-20 RX ADMIN — OXYTOCIN 3 UNITS: 10 INJECTION, SOLUTION INTRAMUSCULAR; INTRAVENOUS at 10:20

## 2021-06-20 RX ADMIN — FAMOTIDINE 20 MG: 10 INJECTION, SOLUTION INTRAVENOUS at 09:49

## 2021-06-20 RX ADMIN — DOCUSATE SODIUM 100 MG: 100 CAPSULE, LIQUID FILLED ORAL at 22:05

## 2021-06-20 RX ADMIN — Medication 200 MCG: at 09:58

## 2021-06-20 RX ADMIN — KETOROLAC TROMETHAMINE 30 MG: 30 INJECTION, SOLUTION INTRAMUSCULAR at 11:17

## 2021-06-20 RX ADMIN — Medication 200 MCG: at 10:25

## 2021-06-20 RX ADMIN — ACETAMINOPHEN 1000 MG: 500 TABLET, FILM COATED ORAL at 22:05

## 2021-06-20 NOTE — PLAN OF CARE
Problem: Breastfeeding  Goal: Effective Breastfeeding  Outcome: Ongoing, Progressing  Intervention: Promote Breast Care and Comfort  Flowsheets (Taken 6/20/2021 1400)  Breast Care: Breastfeeding: frequency of feeding adjusted  Intervention: Promote Effective Breastfeeding  Flowsheets (Taken 6/20/2021 1400)  Breastfeeding Assistance:   feeding cue recognition promoted   feeding on demand promoted  Parent/Child Attachment Promotion:   positive reinforcement provided   rooming-in promoted   skin-to-skin contact encouraged   participation in care promoted   parent/caregiver presence encouraged  Intervention: Support Exclusive Breastfeeding Success  Flowsheets (Taken 6/20/2021 1400)  Supportive Measures: positive reinforcement provided  Breastfeeding Support: lactation counseling provided   Goal Outcome Evaluation:

## 2021-06-20 NOTE — ANESTHESIA PROCEDURE NOTES
Spinal Block      Patient reassessed immediately prior to procedure    Patient location during procedure: OB  Performed By  Anesthesiologist: Irwin Gillis DO  Preanesthetic Checklist  Completed: patient identified, IV checked, site marked, risks and benefits discussed, surgical consent, monitors and equipment checked, pre-op evaluation and timeout performed  Spinal Block Prep:  Patient Position:sitting  Sterile Tech:cap, gloves, mask and sterile barriers  Prep:Chloraprep  Patient Monitoring:blood pressure monitoring, continuous pulse oximetry and EKG  Spinal Block Procedure  Approach:midline  Guidance:landmark technique and palpation technique  Location:L3-L4  Needle Type:Fang  Needle Gauge:25 G  Placement of Spinal needle event:cerebrospinal fluid aspirated  Paresthesia: no  Fluid Appearance:clear     Post Assessment  Patient Tolerance:patient tolerated the procedure well with no apparent complications  Complications no

## 2021-06-20 NOTE — ANESTHESIA PREPROCEDURE EVALUATION
Anesthesia Evaluation     Patient summary reviewed and Nursing notes reviewed   NPO Solid Status: > 8 hours  NPO Liquid Status: > 8 hours           Airway   Mallampati: II  TM distance: >3 FB  Neck ROM: full  No difficulty expected  Dental      Pulmonary - negative pulmonary ROS   Cardiovascular - negative cardio ROS        Neuro/Psych  (+) headaches, psychiatric history Anxiety,     GI/Hepatic/Renal/Endo - negative ROS     Musculoskeletal (-) negative ROS    Abdominal    Substance History - negative use     OB/GYN    (+) Pregnant,     Comment: IVF Twin gestation      Other                        Anesthesia Plan    ASA 2     spinal and ITN       Anesthetic plan, all risks, benefits, and alternatives have been provided, discussed and informed consent has been obtained with: patient.  Use of blood products discussed with patient .

## 2021-06-20 NOTE — H&P
35-year-old G2, P1 at 37 weeks.  Di-ditwin gestation.  Patient has been off work for the last 1 to 2 weeks due to upper normal blood pressures greater than 140/90 on several occasions.  BPP's with low normal amniotic fluid.  We decided to proceed with a primary  section at 37 weeks.  Past medical history: Infertility, headache, endometriosis, bicornate uterus, anxiety  Surgeries: Thornton teeth, tonsillectomy, knee, diagnostic laparoscopy x2, fertility  Maternal blood type: O+  Fetal presentation breech , cephalic presentation     Afeb VSS  Ht- RR  Lungs- Clear  Abd- soft nontender  Uterus- appropriate for gestational age     A/P di-ditwins at 37 weeks         Gestational hypertension with upper normal blood pressures, edema, and headache, low normal CEDRICK  Breech, cephalic presentation          Plan primary  section at 37 weeks    Chart and prenatal record reviewed

## 2021-06-20 NOTE — ANESTHESIA POSTPROCEDURE EVALUATION
Patient: Jaci Armstrong    Procedure Summary     Date: 21 Room / Location: Critical access hospital LABOR DELIVERY   [facility] LABOR DELIVERY    Anesthesia Start: 946 Anesthesia Stop:     Procedure:  SECTION PRIMARY (N/A Abdomen) Diagnosis:     Surgeons: Negro Philip MD Provider: Irwin Gillis DO    Anesthesia Type: spinal, ITN ASA Status: 2          Anesthesia Type: spinal, ITN    Vitals  Vitals Value Taken Time   /60    Temp 97.6 F    Pulse 69 21 1056   Resp 16    SpO2 97 % 21 1056   Vitals shown include unvalidated device data.        Post Anesthesia Care and Evaluation    Patient location during evaluation: bedside  Patient participation: complete - patient participated  Level of consciousness: awake  Pain score: 0  Pain management: satisfactory to patient  Airway patency: patent  Anesthetic complications: No anesthetic complications  PONV Status: none  Cardiovascular status: acceptable and hemodynamically stable  Respiratory status: acceptable  Hydration status: acceptable

## 2021-06-20 NOTE — LACTATION NOTE
06/20/21 1400   Maternal Information   Date of Referral 06/20/21   Person Making Referral other (see comments)  (courtesy)   Maternal Infant Feeding   Maternal Emotional State receptive;relaxed   Milk Expression/Equipment   Breast Pump Type double electric, personal     Mom states she nursed her first child about 10 weeks. States she may supplement some with the twins and pump so support can help her. Discussed the best way to feed twins at first to establish latch. Teaching done. Enc to call for asst PRN

## 2021-06-20 NOTE — OP NOTE
Operative Note    Patient name: Jaci Armstrong  YOB: 1985   MRN: [id number]  Admission Date: 2021  Referring Provider: Negro Philip MD    ID: 35 y.o.  at 37w0d    Preoperative Diagnosis:   Di-ditwins at 37 weeks  Breech vertex presentation  Low normal amniotic fluid  Gestational hypertension with blood pressures greater than 140/90 on several occasions  Patient Active Problem List   Diagnosis   • Endometriosis   • Twin pregnancy, dichorionic/diamniotic, unspecified trimester   • Bicornuate uterus   • Gestational hypertension, third trimester   • Dichorionic diamniotic twin pregnancy in third trimester   • Pregnancy with low amniotic fluid volume and normal size fetus   • Twin pregnancy, twins dichorionic and diamniotic       Postoperative Diagnosis: Same as above.    Procedure(s): primarylow transverse  delivery     Surgeons: Surgeon(s) and Role:     * Negro Philip MD - Primary    Staff:  Surgeon(s):  Negro Philip MD    Assistant: Yamile Birch    Anesthesia: Spinal    Estimated Blood Loss: 400 mL mL    IV Fluids: [unfilled]    Preoperative antibiotic: Ancef (cefazolin) 2 grams    Blood products:   Blood Administration Record (From admission, onward)    None          Pathology:   Order Name Source Comment Collection Info Order Time   TISSUE PATHOLOGY EXAM Placenta  Collected By: Mel Red RN 2021 10:28 AM     Specimen source(s):   Placenta          Release to patient   Immediate            Drains: Cline catheter to gravity    Complications: None    Condition: Stable to recovery room      Infant:              PattiJl [[id number]]         Duong Armstrong [[id number]]          Gender:    Patti NeftaliJesús MIRELLA [[id number]]   male      Duong Armstrong [[id number]]   female    infant    Weight:    Jl Armstrong [[id number]]   3280 g (7 lb 3.7 oz)      Duong Armstrong [[id number]]   2865 g (6 lb 5.1 oz)        Apgars:    PattiAlicia chrisBoy A [[id number]]   7      Patti, AlanasGirl B [[id number]]   8    @ 1 minute /        PattiNeftali chrisnasBoy A [[id number]]   9      Patti, AlanasGirl B [[id number]]   9    @ 5 minutes    Cord gases: Venous:       Patti, AlanasBoy A [[id number]]   No components found for:  PHCVEN,  BECVEN      Patti, AlanasGirl B [[id number]]   No components found for:  PHCVEN,  BECVEN        Arterial:       Patti, AlanasBoy A [[id number]]   No components found for:  PHCART,  BECART      Patti, AlanasGirl B [[id number]]   No components found for:  PHCART,  BECART            Operative Summary:   After obtaining informed consent the patient was taken to the operating room where adequate anesthesia was obtained.  Cline catheter was placed in the bladder preoperatively.  IV antibiotics were given preoperatively.       The abdomen was prepped and draped in the usual sterile fashion for  delivery.  After confirming adequate anesthesia a Pfannenstiel skin incision was made with the scalpel and carried through to the underlying layer of fascia.  The fascia was incised in the midline and the incision extended laterally with the Bryant scissors and with blunt dissection.       The upper aspect of the fascia was grasped with 2 Kocher clamps, elevated, and dissected off the underlying rectus muscles bluntly and with the Bryant scissors.  The Kocher clamps were removed and applied to the inferior aspect of the fascia.  The fascia was dissected off of the rectus muscles in the same fashion.  The peritoneum was entered bluntly.  The incision was stretched and the bladder blade and Pastor retractor inserted for visualization of the uterus.      The uterus was incised with the scalpel in a low transverse fashion.  The uterine incision was entered digitally and the incision extended bluntly in a cranial-caudal fashion.  Retractors were removed and membranes were ruptured.  Infant A was delivered atraumatically  from breech presentation.  The umbilical cord was clamped and cut and the nose and mouth bulb suctioned.  Infant A was handed off to waiting pediatric staff.    Infant B was delivered from a cephalic presentation.  Cord was clamped and cut it was handed off to the pediatric staff.    Cord blood gases were not collected.  Cord blood was collected.  The placenta was removed using cord traction and uterine massage.  The uterus was exteriorized and cleared of all clots and debris.  The uterine incision was repaired with 1-0 Chromic in a running locked fashion. A single-layer technique was used.  Additional hemostatic measures required: electrocautery and figure-of-eight sutures.    The incision was inspected and excellent hemostasis was noted.  The tubes and ovaries were noted to be normal. The uterus was returned to the abdomen.  The gutters were cleared of all clots and debris.  Irrigation was used.  The uterine incision was again inspected and found to be hemostatic.      The peritoneum was reapproximated with 2.0 Vicryl .  The fascia was closed with 0 Vicryl  in a running fashion (two segments).  The subcutaneous space was reapproximated using 3.0 Plain.      The skin was closed using insorb stapler.  The patient was transferred to the recovery room in stable condition.            Negro Philip MD  6/20/2021  10:57 EDT

## 2021-06-21 LAB
BASOPHILS # BLD AUTO: 0.03 10*3/MM3 (ref 0–0.2)
BASOPHILS NFR BLD AUTO: 0.4 % (ref 0–1.5)
DEPRECATED RDW RBC AUTO: 45.7 FL (ref 37–54)
EOSINOPHIL # BLD AUTO: 0.13 10*3/MM3 (ref 0–0.4)
EOSINOPHIL NFR BLD AUTO: 1.7 % (ref 0.3–6.2)
ERYTHROCYTE [DISTWIDTH] IN BLOOD BY AUTOMATED COUNT: 13.9 % (ref 12.3–15.4)
HCT VFR BLD AUTO: 25.4 % (ref 34–46.6)
HGB BLD-MCNC: 8.3 G/DL (ref 12–15.9)
IMM GRANULOCYTES # BLD AUTO: 0.03 10*3/MM3 (ref 0–0.05)
IMM GRANULOCYTES NFR BLD AUTO: 0.4 % (ref 0–0.5)
LYMPHOCYTES # BLD AUTO: 1.63 10*3/MM3 (ref 0.7–3.1)
LYMPHOCYTES NFR BLD AUTO: 21.2 % (ref 19.6–45.3)
MCH RBC QN AUTO: 29.3 PG (ref 26.6–33)
MCHC RBC AUTO-ENTMCNC: 32.7 G/DL (ref 31.5–35.7)
MCV RBC AUTO: 89.8 FL (ref 79–97)
MONOCYTES # BLD AUTO: 0.58 10*3/MM3 (ref 0.1–0.9)
MONOCYTES NFR BLD AUTO: 7.5 % (ref 5–12)
NEUTROPHILS NFR BLD AUTO: 5.29 10*3/MM3 (ref 1.7–7)
NEUTROPHILS NFR BLD AUTO: 68.8 % (ref 42.7–76)
NRBC BLD AUTO-RTO: 0 /100 WBC (ref 0–0.2)
PLATELET # BLD AUTO: 165 10*3/MM3 (ref 140–450)
PMV BLD AUTO: 10.9 FL (ref 6–12)
RBC # BLD AUTO: 2.83 10*6/MM3 (ref 3.77–5.28)
WBC # BLD AUTO: 7.69 10*3/MM3 (ref 3.4–10.8)

## 2021-06-21 PROCEDURE — 85025 COMPLETE CBC W/AUTO DIFF WBC: CPT | Performed by: OBSTETRICS & GYNECOLOGY

## 2021-06-21 PROCEDURE — 25010000002 KETOROLAC TROMETHAMINE PER 15 MG: Performed by: OBSTETRICS & GYNECOLOGY

## 2021-06-21 PROCEDURE — 0503F POSTPARTUM CARE VISIT: CPT | Performed by: NURSE PRACTITIONER

## 2021-06-21 RX ADMIN — IBUPROFEN 600 MG: 600 TABLET, FILM COATED ORAL at 19:19

## 2021-06-21 RX ADMIN — ACETAMINOPHEN 1000 MG: 500 TABLET, FILM COATED ORAL at 04:40

## 2021-06-21 RX ADMIN — PRENATAL VITAMINS-IRON FUMARATE 27 MG IRON-FOLIC ACID 0.8 MG TABLET 1 TABLET: at 11:06

## 2021-06-21 RX ADMIN — IBUPROFEN 600 MG: 600 TABLET, FILM COATED ORAL at 23:35

## 2021-06-21 RX ADMIN — Medication: at 10:52

## 2021-06-21 RX ADMIN — ACETAMINOPHEN 650 MG: 325 TABLET, FILM COATED ORAL at 16:58

## 2021-06-21 RX ADMIN — KETOROLAC TROMETHAMINE 15 MG: 15 INJECTION, SOLUTION INTRAMUSCULAR; INTRAVENOUS at 13:16

## 2021-06-21 RX ADMIN — KETOROLAC TROMETHAMINE 15 MG: 15 INJECTION, SOLUTION INTRAMUSCULAR; INTRAVENOUS at 00:41

## 2021-06-21 RX ADMIN — KETOROLAC TROMETHAMINE 15 MG: 15 INJECTION, SOLUTION INTRAMUSCULAR; INTRAVENOUS at 06:35

## 2021-06-21 RX ADMIN — DOCUSATE SODIUM 100 MG: 100 CAPSULE, LIQUID FILLED ORAL at 20:15

## 2021-06-21 RX ADMIN — SIMETHICONE 80 MG: 80 TABLET, CHEWABLE ORAL at 20:15

## 2021-06-21 RX ADMIN — ACETAMINOPHEN 650 MG: 325 TABLET, FILM COATED ORAL at 21:50

## 2021-06-21 RX ADMIN — ACETAMINOPHEN 1000 MG: 500 TABLET, FILM COATED ORAL at 11:06

## 2021-06-21 NOTE — PROGRESS NOTES
2021    Name:Jaci Armstrong    MR#:[id number]     PROGRESS NOTE:  Post-Op day 1 S/P    HD:1    Subjective   35 y.o. yo Female  s/p CS at 37w0d doing well. Pain well controlled. Tolerating regular diet and having flatus. Lochia normal.     Patient Active Problem List   Diagnosis   • Endometriosis   • Twin pregnancy, dichorionic/diamniotic, unspecified trimester   • Bicornuate uterus   • Gestational hypertension, third trimester   • Dichorionic diamniotic twin pregnancy in third trimester   • Pregnancy with low amniotic fluid volume and normal size fetus   • Twin pregnancy, twins dichorionic and diamniotic        Objective    Vitals  Temp:  Temp:  [97.5 °F (36.4 °C)-98.5 °F (36.9 °C)] 98.1 °F (36.7 °C)  Temp src: Oral  BP:  BP: ()/(52-68) 118/59  Pulse:  Heart Rate:  [57-80] 80  RR:   Resp:  [16-18] 16    General Awake, alert, no distress  Abdomen Soft, non-distended, fundus firm, below umbilicus, appropriately tender  Incision  Bandage CDI  Extremities Calves NT bilaterally     I/O last 3 completed shifts:  In: 1400 [I.V.:1400]  Out: 3312 [Urine:2375; Blood:937]    LABS:   Lab Results   Component Value Date    WBC 7.69 2021    HGB 8.3 (L) 2021    HCT 25.4 (L) 2021    MCV 89.8 2021     2021       Infant:      Jl Armstrong [[id number]]   male      Duong Armstrong [[id number]]   female         Assessment   1.  POD day 1   2.  Twins, girl, boy (desires circ)   3.   Anemia, hemodynamically stable (EBL= 937)   4.   Breast pumping   5.   MBT= O positive  Plan: Doing well.  Routine postoperative care      Active Problems:   Anemia   CBC in am      ALESSANDRO Richardson  2021 09:29 EDT

## 2021-06-21 NOTE — ANESTHESIA POSTPROCEDURE EVALUATION
Patient: Jaci Armstrong    Procedure Summary     Date: 21 Room / Location: Critical access hospital LABOR DELIVERY   [facility] LABOR DELIVERY    Anesthesia Start: 946 Anesthesia Stop:     Procedure:  SECTION PRIMARY (N/A Abdomen) Diagnosis:     Surgeons: Negro Philip MD Provider: Irwin Gillis DO    Anesthesia Type: spinal, ITN ASA Status: 2          Anesthesia Type: spinal, ITN    Vitals  Vitals Value Taken Time   /59 21 0800   Temp 98.1 °F (36.7 °C) 21 0800   Pulse 80 21 0800   Resp 16 21 0800   SpO2 98 % 21 1155   Vitals shown include unvalidated device data.        Post Anesthesia Care and Evaluation    Patient location during evaluation: bedside  Patient participation: complete - patient participated  Level of consciousness: awake and awake and alert  Pain score: 0  Pain management: satisfactory to patient  Airway patency: patent  Anesthetic complications: No anesthetic complications  PONV Status: none  Cardiovascular status: acceptable  Respiratory status: acceptable  Hydration status: acceptable  Post Neuraxial Block status: Motor and sensory function returned to baseline and No signs or symptoms of PDPH

## 2021-06-22 VITALS
TEMPERATURE: 98.1 F | BODY MASS INDEX: 32.28 KG/M2 | DIASTOLIC BLOOD PRESSURE: 83 MMHG | SYSTOLIC BLOOD PRESSURE: 129 MMHG | HEIGHT: 68 IN | WEIGHT: 213 LBS | RESPIRATION RATE: 20 BRPM | HEART RATE: 82 BPM | OXYGEN SATURATION: 98 %

## 2021-06-22 PROBLEM — O41.00X0 PREGNANCY WITH LOW AMNIOTIC FLUID VOLUME AND NORMAL SIZE FETUS: Status: RESOLVED | Noted: 2021-06-17 | Resolved: 2021-06-22

## 2021-06-22 PROBLEM — O30.043 DICHORIONIC DIAMNIOTIC TWIN PREGNANCY IN THIRD TRIMESTER: Status: RESOLVED | Noted: 2021-06-14 | Resolved: 2021-06-22

## 2021-06-22 PROBLEM — O13.3 GESTATIONAL HYPERTENSION, THIRD TRIMESTER: Status: RESOLVED | Noted: 2021-06-09 | Resolved: 2021-06-22

## 2021-06-22 LAB
DEPRECATED RDW RBC AUTO: 47.8 FL (ref 37–54)
ERYTHROCYTE [DISTWIDTH] IN BLOOD BY AUTOMATED COUNT: 14.2 % (ref 12.3–15.4)
HCT VFR BLD AUTO: 26.7 % (ref 34–46.6)
HGB BLD-MCNC: 8 G/DL (ref 12–15.9)
MCH RBC QN AUTO: 28 PG (ref 26.6–33)
MCHC RBC AUTO-ENTMCNC: 30 G/DL (ref 31.5–35.7)
MCV RBC AUTO: 93.4 FL (ref 79–97)
PLATELET # BLD AUTO: 173 10*3/MM3 (ref 140–450)
PMV BLD AUTO: 10.5 FL (ref 6–12)
RBC # BLD AUTO: 2.86 10*6/MM3 (ref 3.77–5.28)
WBC # BLD AUTO: 8.46 10*3/MM3 (ref 3.4–10.8)

## 2021-06-22 PROCEDURE — 0503F POSTPARTUM CARE VISIT: CPT | Performed by: NURSE PRACTITIONER

## 2021-06-22 PROCEDURE — 85027 COMPLETE CBC AUTOMATED: CPT | Performed by: NURSE PRACTITIONER

## 2021-06-22 RX ORDER — OXYCODONE HYDROCHLORIDE 5 MG/1
5-10 TABLET ORAL EVERY 6 HOURS PRN
Qty: 18 TABLET | Refills: 0 | Status: SHIPPED | OUTPATIENT
Start: 2021-06-22 | End: 2021-07-06

## 2021-06-22 RX ORDER — UREA 10 %
140 LOTION (ML) TOPICAL 2 TIMES DAILY WITH MEALS
Qty: 60 TABLET | Refills: 0 | Status: SHIPPED | OUTPATIENT
Start: 2021-06-22 | End: 2021-07-06

## 2021-06-22 RX ORDER — IBUPROFEN 600 MG/1
600 TABLET ORAL EVERY 6 HOURS
Qty: 30 TABLET | Refills: 0 | Status: SHIPPED | OUTPATIENT
Start: 2021-06-22 | End: 2021-07-06

## 2021-06-22 RX ADMIN — ACETAMINOPHEN 650 MG: 325 TABLET, FILM COATED ORAL at 04:15

## 2021-06-22 RX ADMIN — SIMETHICONE 80 MG: 80 TABLET, CHEWABLE ORAL at 07:42

## 2021-06-22 RX ADMIN — DOCUSATE SODIUM 100 MG: 100 CAPSULE, LIQUID FILLED ORAL at 07:42

## 2021-06-22 RX ADMIN — IBUPROFEN 600 MG: 600 TABLET, FILM COATED ORAL at 12:19

## 2021-06-22 RX ADMIN — ACETAMINOPHEN 650 MG: 325 TABLET, FILM COATED ORAL at 10:23

## 2021-06-22 RX ADMIN — IBUPROFEN 600 MG: 600 TABLET, FILM COATED ORAL at 06:00

## 2021-06-22 RX ADMIN — PRENATAL VITAMINS-IRON FUMARATE 27 MG IRON-FOLIC ACID 0.8 MG TABLET 1 TABLET: at 07:42

## 2021-06-22 RX ADMIN — SIMETHICONE 80 MG: 80 TABLET, CHEWABLE ORAL at 12:19

## 2021-06-22 NOTE — DISCHARGE SUMMARY
Discharge Summary    Date of Admission: 2021  Date of Discharge:  2021      Patient: Jaci Armstrong      MR#:[id number]    Delivery Provider:      Jl Armstrong [[id number]]   Duong Dunne B [[id number]]   Negro Philip         Presenting Problem/History of Present Illness  Twin pregnancy, twins dichorionic and diamniotic [O30.049]     Patient Active Problem List   Diagnosis   • Endometriosis   • Twin pregnancy, dichorionic/diamniotic, unspecified trimester   • Bicornuate uterus   • Twin pregnancy, twins dichorionic and diamniotic         Discharge Diagnosis:  csection at 37w0d    Procedures:     Jl Armstrong [[id number]]   , Low Transverse      Duong Armstrong B [[id number]]   , Low Transverse          Jl Armstrong A [[id number]]   2021      Michela Armstrongl B [[id number]]   2021         Jl Armstrong A [[id number]]   10:14 AM      Victor M Armstrongrl B [[id number]]   10:15 AM          Hospital Course  Patient is a 35 y.o. female  at 37w0d status post csection for GHTN-- without complication. Postpartum the patient did well. She remained afebrile, with vital signs stable. She was ready for discharge on postpartum day 4.     Infant:        Jl Armstrong A [[id number]]   male      Alicia ArmstrongGirl B [[id number]]   female    fetus      Jl Armstrong A [[id number]]   3280 g (7 lb 3.7 oz)      Victor M Armstrongrl B [[id number]]   2865 g (6 lb 5.1 oz)    with Apgar scores of      Jl Armstrong A [[id number]]   7      PattiAlicia chrisGirl B [[id number]]   8   ,      PattiJl chris A [[id number]]   9      Duong Armstrong [[id number]]   9    at five minutes.    Condition on Discharge:  Stable    Vital Signs  Temp:  [98.5 °F (36.9 °C)-98.9 °F (37.2 °C)] 98.6 °F (37 °C)  Heart Rate:  [68-77] 73  Resp:  [16-18] 16  BP: (108-133)/(60-72) 125/72    Lab Results   Component Value Date    WBC 8.46  06/22/2021    HGB 8.0 (L) 06/22/2021    HCT 26.7 (L) 06/22/2021    MCV 93.4 06/22/2021     06/22/2021       Discharge Disposition  Home or Self Care    Discharge Medications     Discharge Medications      New Medications      Instructions Start Date   ibuprofen 600 MG tablet  Commonly known as: ADVIL,MOTRIN   600 mg, Oral, Every 6 Hours      oxyCODONE 5 MG immediate release tablet  Commonly known as: ROXICODONE   Take 1-2 tablets by mouth Every 6 (Six) Hours As Needed for moderate pain         Changes to Medications      Instructions Start Date   ferrous sulfate 140 (45 Fe) MG tablet controlled-release tablet  What changed: when to take this   140 mg, Oral, 2 Times Daily With Meals         Continue These Medications      Instructions Start Date   PNV-DHA PO   1 dose, Oral, Every Morning             Follow-up Appointments  No future appointments.  Additional Instructions for the Follow-ups that You Need to Schedule     Discharge Follow-up with Specified Provider: shannon; 1 Week   As directed      To: shannon    Follow Up: 1 Week               ALESSANDRO Moore  06/22/21  08:45 EDT  Cs[facility]

## 2021-06-22 NOTE — LACTATION NOTE
"   06/22/21 0950   Maternal Information   Date of Referral 06/22/21   Maternal Assessment   Breast Size Issue none   Breast Shape Bilateral:;round   Breast Density Bilateral:;soft   Nipples Bilateral:;everted   Left Nipple Symptoms intact   Right Nipple Symptoms intact   Maternal Infant Feeding   Maternal Emotional State independent;receptive;relaxed   Infant Positioning clutch/football   Pain with Feeding no   Latch Assistance none needed   Milk Expression/Equipment   Breast Pump Type double electric, personal;double electric, hospital grade     Mom states she is nursing, supp with formula, and pumping with hospital pump. States NB boy is nursing better than NB girl, as she \"seems full from the formula\". Enc to keep putting babies to breast alternating with skin to skin. Enc to call for asst as needed.  "

## 2021-06-25 RX ORDER — ONDANSETRON 4 MG/1
4 TABLET, FILM COATED ORAL DAILY PRN
Qty: 30 TABLET | Refills: 1 | Status: SHIPPED | OUTPATIENT
Start: 2021-06-25 | End: 2021-07-06

## 2021-06-25 NOTE — TELEPHONE ENCOUNTER
Patient calling with severe nausea that started Wednesday - discharged from hospital after  on Tuesday. It's hard for her to eat. She's very weak due to not being able to eat. Tearful on the phone talking about postpartum issues - had depression after first child. She had some leftover zoloft 50mg and is taking that. Would like a script for it.

## 2021-06-25 NOTE — TELEPHONE ENCOUNTER
"Spoke to the patient: Delivered C section twins 06/20/21    She is not having thoughts of harming herself, the baby, or others    Since she came home she has started having nausea, unable to eat, and \"extreme anxiety and  Insomnia\". She does feel a connection with the babies. However, she feels overwhelmed. She reports having a good support system. Her  gets home from work around 04:00 every day. Today her parents are with her all day.     She experienced PP depression after her first baby. She was taking Zoloft 50 mg and says that she was successful on it. She is requesting another script.     Will consult with Dr Naranjo since Dr Philip is out of the office/not on call. PP appt on 07/06/2021.   "

## 2021-06-25 NOTE — TELEPHONE ENCOUNTER
Spoke with Dr Naranjo:     Zofran 4 mg got the nausea  Zoloft 50 mg daily    [facility] pharmacy in [place], [place]    Called patient back, patient verbalized understanding

## 2021-07-06 ENCOUNTER — POSTPARTUM VISIT (OUTPATIENT)
Dept: OBSTETRICS AND GYNECOLOGY | Facility: CLINIC | Age: 36
End: 2021-07-06

## 2021-07-06 VITALS
HEIGHT: 68 IN | SYSTOLIC BLOOD PRESSURE: 124 MMHG | WEIGHT: 172 LBS | DIASTOLIC BLOOD PRESSURE: 76 MMHG | BODY MASS INDEX: 26.07 KG/M2

## 2021-07-06 PROCEDURE — 0503F POSTPARTUM CARE VISIT: CPT | Performed by: NURSE PRACTITIONER

## 2021-07-06 NOTE — PROGRESS NOTES
"No chief complaint on file.      2 Week Postpartum Visit         Jaci Armstrong is a 35 y.o.  s/p , due to di-ditwins, breech vertex presentation  at 37 weeks on 2021, who presents today for a 2 week postpartum check.      Patient reports her incision is clean, dry, intact. Patient reports postpartum depression she is currently on Zoloft and states depression has improved.   Patient describes bleeding as light.  Patient is breast and bottle feeding.  Desires contraceptive methods: Oral progesterone-only contraceptive for contraception.  Patient reports constipation and she denies bladder issues.     Postpartum Depression Screening Questionnaire: 9, no treatment indicated.  Baby Name: male            female  Baby weight: male- 7lbs 3.7oz                        Female- 6lbs 5.1oz   Baby Discharged with Mom      The additional following portions of the patient's history were reviewed and updated as appropriate: allergies, current medications, past family history, past medical history, past social history, past surgical history and problem list.      Review of Systems   Constitutional: Negative.    HENT: Negative.    Eyes: Negative.    Respiratory: Negative.    Cardiovascular: Negative.    Gastrointestinal: Positive for constipation.   Endocrine: Negative.    Genitourinary: Negative.    Musculoskeletal: Negative.    Skin: Negative.    Allergic/Immunologic: Negative.    Neurological: Negative.    Hematological: Negative.    Psychiatric/Behavioral: Negative.        I have reviewed and agree with the HPI, ROS, and historical information as entered above. ALESSNADRO Vergara    Objective   /76   Ht 172.7 cm (67.99\")   Wt 78 kg (172 lb)   BMI 26.16 kg/m²     Physical Exam  HENT:      Head: Normocephalic.   Abdominal:      General: Abdomen is flat.      Palpations: Abdomen is soft.   Musculoskeletal:         General: Normal range of motion.   Skin:     General: Skin is warm and dry.      " Comments: Incision clean, dry and intact   Neurological:      Mental Status: She is alert and oriented to person, place, and time.   Psychiatric:         Mood and Affect: Mood normal.              Assessment and Plan    Problem List Items Addressed This Visit     None      Visit Diagnoses     2 weeks postpartum follow-up    -  Primary          1. S/p , 2 weeks postpartum.  Doing well.    2. Continued pelvic rest with a return to driving and light physical activity.  3. Contraception: contraceptive methods: None currently, pt. Requests to restart at 6 week visit.   4. Constipation: encouraged to increase fiber and water intake and start Miralax once a day.   5. PPD well controlled on Zoloft.   6. Return in about 4 weeks for 6 week PP visit.     Claudia Contreras, ALESSANDRO  2021

## 2021-08-07 LAB
CYTO UR: NORMAL
LAB AP CASE REPORT: NORMAL
LAB AP CLINICAL INFORMATION: NORMAL
PATH REPORT.FINAL DX SPEC: NORMAL
PATH REPORT.GROSS SPEC: NORMAL

## 2021-08-16 ENCOUNTER — POSTPARTUM VISIT (OUTPATIENT)
Dept: OBSTETRICS AND GYNECOLOGY | Facility: CLINIC | Age: 36
End: 2021-08-16

## 2021-08-16 VITALS
HEIGHT: 68 IN | WEIGHT: 174 LBS | DIASTOLIC BLOOD PRESSURE: 70 MMHG | BODY MASS INDEX: 26.37 KG/M2 | SYSTOLIC BLOOD PRESSURE: 100 MMHG

## 2021-08-16 DIAGNOSIS — O30.049 TWIN PREGNANCY, DICHORIONIC/DIAMNIOTIC, UNSPECIFIED TRIMESTER: Primary | ICD-10-CM

## 2021-08-16 PROBLEM — Z30.09 ADVISED ABOUT ORAL CONTRACEPTION: Status: ACTIVE | Noted: 2021-08-16

## 2021-08-16 PROCEDURE — 0503F POSTPARTUM CARE VISIT: CPT | Performed by: OBSTETRICS & GYNECOLOGY

## 2021-08-16 RX ORDER — LEVONORGESTREL AND ETHINYL ESTRADIOL 100-20(84)
1 KIT ORAL DAILY
Qty: 91 TABLET | Refills: 4 | Status: SHIPPED | OUTPATIENT
Start: 2021-08-16 | End: 2022-06-28 | Stop reason: SDUPTHER

## 2021-08-16 NOTE — PROGRESS NOTES
Chief Complaint   Patient presents with   • Postpartum Care       6 Week Postpartum Visit         Jaci Armstrong is a 35 y.o.  s/p , due to  due to sudhakar, breech vertex presentation  at 37 weeks on 2021, who presents today for a 6 week postpartum check.   At the time of delivery were you diagnosed with any of the following: None.  Patient reports that he  is clean, dry, intact and healing well.     Patient reports postpartum depression.  Patient describes bleeding as absent.  Patient is bottle feeding.  Desires contraceptive methods: None for contraception and would like to discuss both control pills.  Patient denies bowel or bladder issues.    Postpartum Depression Screening Questionnaire: 8. Patient currently taking Zoloft and feels as though she is benefiting from it.   Baby Name: male/female  Baby Weight: male: 7 lbs 3.7 oz/ female: 6 lbs 5.1 oz  Baby Discharged: Discharged with Mom  Delivering Physician: PARTH     Last Completed Pap Smear          PAP SMEAR (Every 3 Years) Next due on 10/15/2023    10/15/2020  Pap IG, HPV-hr    10/07/2020  SCANNED - PAP SMEAR              Is the patient due for a pap today? No; Due in 2021, last one was 2020 per patient.     The additional following portions of the patient's history were reviewed and updated as appropriate: allergies, current medications, past family history, past medical history, past social history, past surgical history and problem list.    Review of Systems   Constitutional: Negative.    HENT: Negative.    Eyes: Negative.    Respiratory: Negative.    Cardiovascular: Negative.    Gastrointestinal: Negative.    Endocrine: Negative.    Genitourinary: Negative.    Musculoskeletal: Negative.    Skin: Negative.    Allergic/Immunologic: Negative.    Neurological: Negative.    Hematological: Negative.    Psychiatric/Behavioral: Negative.      All other systems reviewed and are negative.     I have reviewed  "and agree with the HPI, ROS, and historical information as entered above. Negro Philip MD    /70   Ht 172.7 cm (67.99\")   Wt 78.9 kg (174 lb)   LMP 2021 (Exact Date)   Breastfeeding No   BMI 26.46 kg/m²     Physical Exam  Incision healing well abdomen soft  External genitalia normal  Vagina normal  Cervix without lesion  Bimanual revealed no lumps or masses    Uterus normal size, adnexa clear  Assessment and Plan    Problem List Items Addressed This Visit        Gravid and     Twin pregnancy, dichorionic/diamniotic, unspecified trimester - Primary    Postpartum follow-up          1. S/p , 6 weeks postpartum.  Doing well.    2. Return to normal physical activity.  No pelvic restrictions.   3. Contraception: contraceptive methods: OCP (estrogen/progesterone)  4. Return in about 1 year (around 2022).     Negro Philip MD  2021  "

## 2021-12-08 ENCOUNTER — HOSPITAL ENCOUNTER (EMERGENCY)
Facility: HOSPITAL | Age: 36
Discharge: HOME | End: 2021-12-08
Attending: FAMILY MEDICINE | Admitting: FAMILY MEDICINE

## 2021-12-08 VITALS
BODY MASS INDEX: 27.28 KG/M2 | DIASTOLIC BLOOD PRESSURE: 66 MMHG | SYSTOLIC BLOOD PRESSURE: 109 MMHG | OXYGEN SATURATION: 97 % | TEMPERATURE: 97.6 F | HEIGHT: 68 IN | RESPIRATION RATE: 16 BRPM | WEIGHT: 180 LBS | HEART RATE: 79 BPM

## 2021-12-08 DIAGNOSIS — R55 SYNCOPE, UNSPECIFIED SYNCOPE TYPE: ICD-10-CM

## 2021-12-08 DIAGNOSIS — R11.2 NON-INTRACTABLE VOMITING WITH NAUSEA, UNSPECIFIED VOMITING TYPE: Primary | ICD-10-CM

## 2021-12-08 LAB
ALBUMIN SERPL-MCNC: 4.4 G/DL (ref 3.5–5.2)
ALBUMIN/GLOB SERPL: 1.6 G/DL
ALP SERPL-CCNC: 98 U/L (ref 39–117)
ALT SERPL W P-5'-P-CCNC: 50 U/L (ref 1–33)
ANION GAP SERPL CALCULATED.3IONS-SCNC: 9.4 MMOL/L (ref 5–15)
AST SERPL-CCNC: 27 U/L (ref 1–32)
B-HCG UR QL: NEGATIVE
BASOPHILS # BLD AUTO: 0.04 10*3/MM3 (ref 0–0.2)
BASOPHILS NFR BLD AUTO: 0.3 % (ref 0–1.5)
BILIRUB SERPL-MCNC: 0.2 MG/DL (ref 0–1.2)
BILIRUB UR QL STRIP: NEGATIVE
BUN SERPL-MCNC: 22 MG/DL (ref 6–20)
BUN/CREAT SERPL: 26.2 (ref 7–25)
CALCIUM SPEC-SCNC: 8.7 MG/DL (ref 8.6–10.5)
CHLORIDE SERPL-SCNC: 98 MMOL/L (ref 98–107)
CLARITY UR: ABNORMAL
CO2 SERPL-SCNC: 27.6 MMOL/L (ref 22–29)
COLOR UR: ABNORMAL
CREAT SERPL-MCNC: 0.84 MG/DL (ref 0.57–1)
DEPRECATED RDW RBC AUTO: 38.6 FL (ref 37–54)
EOSINOPHIL # BLD AUTO: 0.1 10*3/MM3 (ref 0–0.4)
EOSINOPHIL NFR BLD AUTO: 0.8 % (ref 0.3–6.2)
ERYTHROCYTE [DISTWIDTH] IN BLOOD BY AUTOMATED COUNT: 12.6 % (ref 12.3–15.4)
FLUAV RNA RESP QL NAA+PROBE: NOT DETECTED
FLUBV RNA RESP QL NAA+PROBE: NOT DETECTED
GFR SERPL CREATININE-BSD FRML MDRD: 77 ML/MIN/1.73
GLOBULIN UR ELPH-MCNC: 2.8 GM/DL
GLUCOSE SERPL-MCNC: 135 MG/DL (ref 65–99)
GLUCOSE UR STRIP-MCNC: NEGATIVE MG/DL
HCT VFR BLD AUTO: 37.6 % (ref 34–46.6)
HGB BLD-MCNC: 12.3 G/DL (ref 12–15.9)
HGB UR QL STRIP.AUTO: NEGATIVE
HOLD SPECIMEN: NORMAL
HOLD SPECIMEN: NORMAL
IMM GRANULOCYTES # BLD AUTO: 0.04 10*3/MM3 (ref 0–0.05)
IMM GRANULOCYTES NFR BLD AUTO: 0.3 % (ref 0–0.5)
KETONES UR QL STRIP: ABNORMAL
LEUKOCYTE ESTERASE UR QL STRIP.AUTO: NEGATIVE
LIPASE SERPL-CCNC: 33 U/L (ref 13–60)
LYMPHOCYTES # BLD AUTO: 1.7 10*3/MM3 (ref 0.7–3.1)
LYMPHOCYTES NFR BLD AUTO: 13.8 % (ref 19.6–45.3)
MCH RBC QN AUTO: 27.2 PG (ref 26.6–33)
MCHC RBC AUTO-ENTMCNC: 32.7 G/DL (ref 31.5–35.7)
MCV RBC AUTO: 83.2 FL (ref 79–97)
MONOCYTES # BLD AUTO: 0.73 10*3/MM3 (ref 0.1–0.9)
MONOCYTES NFR BLD AUTO: 5.9 % (ref 5–12)
NEUTROPHILS NFR BLD AUTO: 78.9 % (ref 42.7–76)
NEUTROPHILS NFR BLD AUTO: 9.73 10*3/MM3 (ref 1.7–7)
NITRITE UR QL STRIP: NEGATIVE
NRBC BLD AUTO-RTO: 0 /100 WBC (ref 0–0.2)
PH UR STRIP.AUTO: <=5 [PH] (ref 5–8)
PLATELET # BLD AUTO: 345 10*3/MM3 (ref 140–450)
PMV BLD AUTO: 9.2 FL (ref 6–12)
POTASSIUM SERPL-SCNC: 3.8 MMOL/L (ref 3.5–5.2)
PROT SERPL-MCNC: 7.2 G/DL (ref 6–8.5)
PROT UR QL STRIP: ABNORMAL
RBC # BLD AUTO: 4.52 10*6/MM3 (ref 3.77–5.28)
SARS-COV-2 RNA RESP QL NAA+PROBE: NOT DETECTED
SODIUM SERPL-SCNC: 135 MMOL/L (ref 136–145)
SP GR UR STRIP: >=1.03 (ref 1–1.03)
TROPONIN T SERPL-MCNC: <0.01 NG/ML (ref 0–0.03)
UROBILINOGEN UR QL STRIP: ABNORMAL
WBC NRBC COR # BLD: 12.34 10*3/MM3 (ref 3.4–10.8)
WHOLE BLOOD HOLD SPECIMEN: NORMAL

## 2021-12-08 PROCEDURE — 81003 URINALYSIS AUTO W/O SCOPE: CPT | Performed by: FAMILY MEDICINE

## 2021-12-08 PROCEDURE — 81025 URINE PREGNANCY TEST: CPT | Performed by: FAMILY MEDICINE

## 2021-12-08 PROCEDURE — 99283 EMERGENCY DEPT VISIT LOW MDM: CPT

## 2021-12-08 PROCEDURE — 96374 THER/PROPH/DIAG INJ IV PUSH: CPT

## 2021-12-08 PROCEDURE — 93005 ELECTROCARDIOGRAM TRACING: CPT | Performed by: FAMILY MEDICINE

## 2021-12-08 PROCEDURE — 85025 COMPLETE CBC W/AUTO DIFF WBC: CPT | Performed by: FAMILY MEDICINE

## 2021-12-08 PROCEDURE — 80053 COMPREHEN METABOLIC PANEL: CPT | Performed by: FAMILY MEDICINE

## 2021-12-08 PROCEDURE — 25010000002 PROCHLORPERAZINE 10 MG/2ML SOLUTION: Performed by: FAMILY MEDICINE

## 2021-12-08 PROCEDURE — 83690 ASSAY OF LIPASE: CPT | Performed by: FAMILY MEDICINE

## 2021-12-08 PROCEDURE — 87636 SARSCOV2 & INF A&B AMP PRB: CPT | Performed by: FAMILY MEDICINE

## 2021-12-08 PROCEDURE — 84484 ASSAY OF TROPONIN QUANT: CPT | Performed by: FAMILY MEDICINE

## 2021-12-08 RX ORDER — ONDANSETRON 4 MG/1
4 TABLET, ORALLY DISINTEGRATING ORAL EVERY 6 HOURS PRN
Qty: 10 TABLET | Refills: 0 | Status: SHIPPED | OUTPATIENT
Start: 2021-12-08 | End: 2022-06-28

## 2021-12-08 RX ORDER — PROCHLORPERAZINE EDISYLATE 5 MG/ML
5 INJECTION INTRAMUSCULAR; INTRAVENOUS ONCE
Status: COMPLETED | OUTPATIENT
Start: 2021-12-08 | End: 2021-12-08

## 2021-12-08 RX ORDER — SODIUM CHLORIDE 0.9 % (FLUSH) 0.9 %
10 SYRINGE (ML) INJECTION AS NEEDED
Status: DISCONTINUED | OUTPATIENT
Start: 2021-12-08 | End: 2021-12-08 | Stop reason: HOSPADM

## 2021-12-08 RX ADMIN — PROCHLORPERAZINE EDISYLATE 5 MG: 5 INJECTION INTRAMUSCULAR; INTRAVENOUS at 02:35

## 2021-12-08 RX ADMIN — SODIUM CHLORIDE 1000 ML: 9 INJECTION, SOLUTION INTRAVENOUS at 00:45

## 2021-12-08 NOTE — ED PROVIDER NOTES
Subjective   36-year-old female presents emergency department via EMS.  Patient reports that she thinks tonight earlier she might of ate some bad food.  Patient reports around 930 she went to bed she was very nauseous then she woke up and apparently per her family she had a syncopal episode patient does not remember what happened.  Patient says she woke up in the bathroom and felt the need to vomit and since that time she has vomited 5 times.  EMS started an IV and gave her fluids and gave her Zofran prior to arrival.  Per her  they have 5-month-old twinS who are not sleeping through the night.  He reports they are averaging maybe 3 hours of sleep a night.      History provided by:  Patient  Vomiting  The primary symptoms include nausea and vomiting. Primary symptoms do not include fever, abdominal pain or dysuria. The illness began today. The onset was sudden. The problem has not changed since onset.      Review of Systems   Constitutional: Negative.  Negative for fever.   HENT: Negative.    Respiratory: Negative.    Cardiovascular: Negative.  Negative for chest pain.   Gastrointestinal: Positive for nausea and vomiting. Negative for abdominal pain.   Endocrine: Negative.    Genitourinary: Negative.  Negative for dysuria.   Skin: Negative.    Neurological: Negative.    Psychiatric/Behavioral: Negative.    All other systems reviewed and are negative.      Past Medical History:   Diagnosis Date   • Anxiety 2017    onset postpartum   • Bicornuate uterus    • Endometriosis    • Headache    • Hyperemesis gravidarum 2020 pregnacy    • Infertility, female        No Known Allergies    Past Surgical History:   Procedure Laterality Date   •  SECTION N/A 2021    Procedure:  SECTION PRIMARY;  Surgeon: Negro Philip MD;  Location: [facility] LABOR DELIVERY;  Service: Obstetrics/Gynecology;  Laterality: N/A;   • DIAGNOSTIC LAPAROSCOPY  2016    bicornuate uterus and endometriosis   •  DIAGNOSTIC LAPAROSCOPY  2019   • FERTILITY SURGERY  08/2020    Oocyte retrieval for IVF 8/2020; frozen transfer 10/22/2020   • KNEE ACL RECONSTRUCTION Left 2003   • TONSILLECTOMY     • WISDOM TOOTH EXTRACTION         Family History   Problem Relation Age of Onset   • Asthma Mother    • Depression Mother    • Hypertension Mother    • Hypertension Father    • Miscarriages / Stillbirths Maternal Aunt        Social History     Socioeconomic History   • Marital status:    Tobacco Use   • Smoking status: Never Smoker   • Smokeless tobacco: Never Used   Vaping Use   • Vaping Use: Never used   Substance and Sexual Activity   • Alcohol use: Not Currently     Comment: 1/week when not pregnant   • Drug use: No   • Sexual activity: Yes     Partners: Male     Birth control/protection: None           Objective   Physical Exam  Vitals and nursing note reviewed.   Constitutional:       Appearance: Normal appearance.   HENT:      Head: Normocephalic and atraumatic.      Right Ear: Tympanic membrane normal.      Left Ear: Tympanic membrane normal.      Mouth/Throat:      Mouth: Mucous membranes are dry.   Eyes:      Extraocular Movements: Extraocular movements intact.      Pupils: Pupils are equal, round, and reactive to light.   Cardiovascular:      Rate and Rhythm: Normal rate and regular rhythm.   Pulmonary:      Effort: Pulmonary effort is normal.   Abdominal:      General: Abdomen is flat.      Palpations: Abdomen is soft.   Musculoskeletal:         General: Normal range of motion.      Cervical back: Normal range of motion.   Skin:     General: Skin is warm.      Capillary Refill: Capillary refill takes less than 2 seconds.   Neurological:      General: No focal deficit present.      Mental Status: She is alert and oriented to person, place, and time.   Psychiatric:         Mood and Affect: Mood normal.         Behavior: Behavior normal.         Thought Content: Thought content normal.         Judgment: Judgment normal.          Procedures           ED Course  ED Course as of 12/10/21 1926   Wed Dec 08, 2021   0049 EKG interpretation time is 0039 sinus rhythm 78 bpm QRS duration is 92 QT is 380 QTC is 414 no evidence of acute ST elevation or depression. [MH]      ED Course User Index  [MH] Mary Emma DO Deanna                                                 MDM  Number of Diagnoses or Management Options  Non-intractable vomiting with nausea, unspecified vomiting type: new and requires workup  Syncope, unspecified syncope type: new and requires workup     Amount and/or Complexity of Data Reviewed  Clinical lab tests: ordered and reviewed  Tests in the radiology section of CPT®: ordered and reviewed  Tests in the medicine section of CPT®: ordered and reviewed  Independent visualization of images, tracings, or specimens: yes    Risk of Complications, Morbidity, and/or Mortality  Presenting problems: high  Diagnostic procedures: high  Management options: high    Patient Progress  Patient progress: stable      Final diagnoses:   Non-intractable vomiting with nausea, unspecified vomiting type   Syncope, unspecified syncope type       ED Disposition  ED Disposition     ED Disposition Condition Comment    Discharge Stable           PATIENT [facility] [address] [phone]             Medication List      New Prescriptions    ondansetron ODT 4 MG disintegrating tablet  Commonly known as: ZOFRAN-ODT  Place 1 tablet on the tongue Every 6 (Six) Hours As Needed for Nausea or Vomiting.           Where to Get Your Medications      These medications were sent to [facility] [address] [place] [address] AT [facility]. - [phone] [facility] [phone] [address] [address]    Phone: [phone]   · ondansetron ODT 4 MG disintegrating tablet          Emma Hernandez DO  12/10/21 1926

## 2021-12-09 PROCEDURE — 96374 THER/PROPH/DIAG INJ IV PUSH: CPT

## 2022-05-02 ENCOUNTER — PATIENT MESSAGE (OUTPATIENT)
Dept: OBSTETRICS AND GYNECOLOGY | Facility: CLINIC | Age: 37
End: 2022-05-02

## 2022-05-03 NOTE — TELEPHONE ENCOUNTER
From: Jaci Armstrong  To: ALESSANDRO Moore  Sent: 5/2/2022 9:46 PM EDT  Subject: Sertraline refill    I have an annual appointment on May 12th. Dr Philip has prescribed me sertraline for the last several years. I have been without for 5 days now, trying to get someone in the office to write me a refill. I can tell a significant difference since I have not been taking my medication. Can you please call this in today. Sertraline 50mg. [facility] pharmacy- [facility]. My cell phone number is [phone]    Thank you,     Jaci Armstrong  [birth year]

## 2022-06-28 ENCOUNTER — OFFICE VISIT (OUTPATIENT)
Dept: OBSTETRICS AND GYNECOLOGY | Facility: CLINIC | Age: 37
End: 2022-06-28

## 2022-06-28 VITALS
HEIGHT: 68 IN | WEIGHT: 177 LBS | SYSTOLIC BLOOD PRESSURE: 110 MMHG | BODY MASS INDEX: 26.83 KG/M2 | DIASTOLIC BLOOD PRESSURE: 70 MMHG

## 2022-06-28 DIAGNOSIS — F41.9 ANXIETY: ICD-10-CM

## 2022-06-28 DIAGNOSIS — Z30.011 ENCOUNTER FOR INITIAL PRESCRIPTION OF CONTRACEPTIVE PILLS: ICD-10-CM

## 2022-06-28 DIAGNOSIS — Z01.419 ENCOUNTER FOR GYNECOLOGICAL EXAMINATION WITHOUT ABNORMAL FINDING: Primary | ICD-10-CM

## 2022-06-28 DIAGNOSIS — Z12.4 SCREENING FOR CERVICAL CANCER: ICD-10-CM

## 2022-06-28 PROCEDURE — 99395 PREV VISIT EST AGE 18-39: CPT | Performed by: PHYSICIAN ASSISTANT

## 2022-06-28 RX ORDER — LEVONORGESTREL AND ETHINYL ESTRADIOL 100-20(84)
1 KIT ORAL DAILY
Qty: 91 TABLET | Refills: 3 | Status: SHIPPED | OUTPATIENT
Start: 2022-06-28

## 2022-06-28 NOTE — PROGRESS NOTES
Subjective   Chief Complaint   Patient presents with   • Gynecologic Exam     Last pap done 10/7/20-WNL, requesting refills on birth control, No complaints       Jaci Armstrong is a 36 y.o. year old  presenting to be seen for her annual gynecological exam.   She has no complaints or concerns  She desires refills on Camrese Lo OCPs and also sertraline 50 mg.  Sertraline has been prescribed by her previous obstetrician for anxiety.  She has been taking this for few years and feels it works well.  She notices a difference in her anxiety if she has not had the sertraline for a few days.  Her LMP was 2022    Past Medical History:   Diagnosis Date   • Anxiety 2017    onset postpartum   • Bicornuate uterus    • Endometriosis    • Gestational hypertension     Hpertension- 36 weeks pregnant with twins   • Headache    • Hyperemesis gravidarum 2020 pregnacy    • Infertility, female    • Kidney stone    • Multiple gestation     Fraternal twin birth 21        Current Outpatient Medications:   •  Levonorgest-Eth Estrad 91-Day (Camrese Lo) 0.1-0.02 & 0.01 MG tablet, Take 1 tablet by mouth Daily., Disp: 91 tablet, Rfl: 3  •  Prenat w/o R-NL-Cknutgn-FA-DHA (PNV-DHA PO), Take 1 dose by mouth Every Morning., Disp: , Rfl:   •  sertraline (ZOLOFT) 50 MG tablet, Take 1 tablet by mouth Daily., Disp: 30 tablet, Rfl: 12   No Known Allergies   Past Surgical History:   Procedure Laterality Date   •  SECTION N/A 2021    Procedure:  SECTION PRIMARY;  Surgeon: Negro Philip MD;  Location: [facility] LABOR DELIVERY;  Service: Obstetrics/Gynecology;  Laterality: N/A;   • DIAGNOSTIC LAPAROSCOPY  2016    bicornuate uterus and endometriosis   • DIAGNOSTIC LAPAROSCOPY     • FERTILITY SURGERY  2020    Oocyte retrieval for IVF 2020; frozen transfer 10/22/2020   • KNEE ACL RECONSTRUCTION Left    • PELVIC LAPAROSCOPY     • TONSILLECTOMY     • WISDOM TOOTH EXTRACTION       "  Social History     Socioeconomic History   • Marital status:    Tobacco Use   • Smoking status: Never Smoker   • Smokeless tobacco: Never Used   Vaping Use   • Vaping Use: Never used   Substance and Sexual Activity   • Alcohol use: Not Currently     Comment: 1/week when not pregnant   • Drug use: No   • Sexual activity: Yes     Partners: Male     Birth control/protection: OCP     Comment: Currently taking BC Pill      Family History   Problem Relation Age of Onset   • Asthma Mother    • Depression Mother    • Hypertension Mother    • Hypertension Father    • Miscarriages / Stillbirths Maternal Aunt        Review of Systems   Constitutional: Negative for chills, diaphoresis and fever.   Gastrointestinal: Negative.    Genitourinary: Negative for difficulty urinating, dysuria, menstrual problem and pelvic pain.           Objective   /70   Ht 172.7 cm (68\")   Wt 80.3 kg (177 lb)   LMP 05/31/2022 (Exact Date)   Breastfeeding No   BMI 26.91 kg/m²     Physical Exam  Constitutional:       Appearance: Normal appearance. She is well-developed and well-groomed.   Eyes:      General: Lids are normal.      Extraocular Movements: Extraocular movements intact.      Conjunctiva/sclera: Conjunctivae normal.   Chest:   Breasts: Breasts are symmetrical.      Right: No inverted nipple, mass, nipple discharge, skin change, tenderness or axillary adenopathy.      Left: No inverted nipple, mass, nipple discharge, skin change, tenderness or axillary adenopathy.       Abdominal:      General: There is no distension.      Palpations: Abdomen is soft. There is no hepatomegaly or splenomegaly.      Tenderness: There is no abdominal tenderness.   Genitourinary:     Exam position: Lithotomy position.      Labia:         Right: No rash, tenderness or lesion.         Left: No rash, tenderness or lesion.       Urethra: No prolapse, urethral pain, urethral swelling or urethral lesion.      Vagina: No vaginal discharge, tenderness " or lesions.      Cervix: No cervical motion tenderness, discharge, friability or lesion.      Uterus: Not enlarged and not tender.       Adnexa:         Right: No mass or tenderness.          Left: No mass or tenderness.     Lymphadenopathy:      Upper Body:      Right upper body: No axillary adenopathy.      Left upper body: No axillary adenopathy.   Skin:     General: Skin is warm and dry.      Findings: No lesion.   Neurological:      General: No focal deficit present.      Mental Status: She is alert and oriented to person, place, and time.   Psychiatric:         Attention and Perception: Attention normal.         Mood and Affect: Mood normal.         Speech: Speech normal.         Behavior: Behavior normal.         Thought Content: Thought content normal.            Result Review :                   Assessment and Plan  Diagnoses and all orders for this visit:    1. Encounter for gynecological examination without abnormal finding (Primary)    2. Screening for cervical cancer  -     LIQUID-BASED PAP SMEAR, [facility] ([facility])    3. Encounter for initial prescription of contraceptive pills    4. Anxiety    Other orders  -     Levonorgest-Eth Estrad 91-Day (Camrese Lo) 0.1-0.02 & 0.01 MG tablet; Take 1 tablet by mouth Daily.  Dispense: 91 tablet; Refill: 3  -     sertraline (ZOLOFT) 50 MG tablet; Take 1 tablet by mouth Daily.  Dispense: 30 tablet; Refill: 12      Patient Instructions   Self breast exam monthly  Regular exercise               This note was electronically signed.    Kim Mathew PA-C   June 28, 2022

## 2022-06-30 LAB — REF LAB TEST METHOD: NORMAL

## 2023-05-15 RX ORDER — LEVONORGESTREL AND ETHINYL ESTRADIOL 100-20(84)
1 KIT ORAL DAILY
Qty: 91 TABLET | Refills: 0 | Status: SHIPPED | OUTPATIENT
Start: 2023-05-15

## 2023-07-25 ENCOUNTER — OFFICE VISIT (OUTPATIENT)
Dept: OBSTETRICS AND GYNECOLOGY | Facility: CLINIC | Age: 38
End: 2023-07-25
Payer: COMMERCIAL

## 2023-07-25 VITALS
BODY MASS INDEX: 21.98 KG/M2 | SYSTOLIC BLOOD PRESSURE: 120 MMHG | DIASTOLIC BLOOD PRESSURE: 76 MMHG | HEIGHT: 68 IN | WEIGHT: 145 LBS

## 2023-07-25 DIAGNOSIS — Z01.419 ROUTINE GYNECOLOGICAL EXAMINATION: Primary | ICD-10-CM

## 2023-07-25 PROCEDURE — 99395 PREV VISIT EST AGE 18-39: CPT | Performed by: PHYSICIAN ASSISTANT

## 2023-07-25 NOTE — PROGRESS NOTES
Subjective   Chief Complaint   Patient presents with    Gynecologic Exam     No pap, last pap done 22-WNL, No complaints       Jaci Armstrong is a 37 y.o. year old  presenting to be seen for her annual gynecological exam.   No complaints or concerns  Her PCP has recently switched her to a monthly cycle pill from her extended cycle pill as she was having breakthrough bleeding on the extended cycle pill  Has just now started the first pack of that and she does not recall the name of the pill  LMP was 2023      Past Medical History:   Diagnosis Date    Anxiety 2017    onset postpartum    Bicornuate uterus     Chronic kidney disease     Endometriosis     Gestational hypertension     Hpertension- 36 weeks pregnant with twins    Headache     Hyperemesis gravidarum 2020 pregnacy     Hypertension     Infertility, female     Kidney stone     Multiple gestation     Fraternal twin birth 21        Current Outpatient Medications:     sertraline (ZOLOFT) 50 MG tablet, Take 1 tablet by mouth Daily., Disp: 30 tablet, Rfl: 12   No Known Allergies   Past Surgical History:   Procedure Laterality Date     SECTION N/A 2021    Procedure:  SECTION PRIMARY;  Surgeon: Negro Philip MD;  Location: [facility] LABOR DELIVERY;  Service: Obstetrics/Gynecology;  Laterality: N/A;    DIAGNOSTIC LAPAROSCOPY  2016    bicornuate uterus and endometriosis    DIAGNOSTIC LAPAROSCOPY      FERTILITY SURGERY  2020    Oocyte retrieval for IVF 2020; frozen transfer 10/22/2020    KNEE ACL RECONSTRUCTION Left     PELVIC LAPAROSCOPY  2016    TONSILLECTOMY      WISDOM TOOTH EXTRACTION        Social History     Socioeconomic History    Marital status:    Tobacco Use    Smoking status: Never    Smokeless tobacco: Never   Vaping Use    Vaping Use: Never used   Substance and Sexual Activity    Alcohol use: Not Currently     Comment: 1/week when not pregnant    Drug use: No     "Sexual activity: Yes     Partners: Male     Birth control/protection: OCP     Comment: Currently taking BC Pill      Family History   Problem Relation Age of Onset    Asthma Mother     Depression Mother     Hypertension Mother     Hypertension Father     Miscarriages / Stillbirths Maternal Aunt        Review of Systems   Constitutional:  Negative for chills, diaphoresis and fever.   Gastrointestinal:  Negative for constipation, diarrhea, nausea and vomiting.   Genitourinary:  Negative for difficulty urinating, dysuria and pelvic pain.         Objective   /76   Ht 172.7 cm (68\")   Wt 65.8 kg (145 lb)   LMP 06/25/2023 (Exact Date)   BMI 22.05 kg/m²     Physical Exam  Exam conducted with a chaperone present.   Constitutional:       Appearance: Normal appearance. She is well-developed and well-groomed.   Eyes:      General: Lids are normal.      Extraocular Movements: Extraocular movements intact.      Conjunctiva/sclera: Conjunctivae normal.   Neck:      Thyroid: No thyroid mass.   Chest:   Breasts:     Breasts are symmetrical.      Right: No inverted nipple, mass, nipple discharge, skin change or tenderness.      Left: No inverted nipple, mass, nipple discharge, skin change or tenderness.   Abdominal:      General: There is no distension.      Palpations: Abdomen is soft. There is no hepatomegaly or splenomegaly.      Tenderness: There is no abdominal tenderness.   Genitourinary:     Exam position: Lithotomy position.      Labia:         Right: No rash, tenderness or lesion.         Left: No rash, tenderness or lesion.       Urethra: No prolapse, urethral pain, urethral swelling or urethral lesion.      Vagina: No vaginal discharge, tenderness or lesions.      Cervix: No cervical motion tenderness, discharge, friability or lesion.      Uterus: Not enlarged and not tender.       Adnexa:         Right: No mass or tenderness.          Left: No mass or tenderness.     Musculoskeletal:      Cervical back: Neck " supple.   Lymphadenopathy:      Upper Body:      Right upper body: No axillary adenopathy.      Left upper body: No axillary adenopathy.   Skin:     General: Skin is warm and dry.      Findings: No lesion.   Neurological:      General: No focal deficit present.      Mental Status: She is alert and oriented to person, place, and time.   Psychiatric:         Attention and Perception: Attention normal.         Mood and Affect: Mood normal.         Speech: Speech normal.         Behavior: Behavior normal.         Thought Content: Thought content normal.          Result Review :                   Assessment and Plan  Diagnoses and all orders for this visit:    1. Routine gynecological examination (Primary)      Patient Instructions   Self breast exam monthly  Regular exercise             This note was electronically signed.    Kim Mathew PA-C   July 25, 2023

## 2025-08-29 ENCOUNTER — TRANSCRIBE ORDERS (OUTPATIENT)
Dept: CT IMAGING | Facility: HOSPITAL | Age: 40
End: 2025-08-29
Payer: COMMERCIAL

## 2025-08-29 ENCOUNTER — HOSPITAL ENCOUNTER (OUTPATIENT)
Dept: CT IMAGING | Facility: HOSPITAL | Age: 40
Discharge: HOME | End: 2025-08-29
Payer: COMMERCIAL

## 2025-08-29 DIAGNOSIS — R10.2 PELVIC PAIN SYNDROME: Primary | ICD-10-CM

## 2025-08-29 DIAGNOSIS — R10.2 PELVIC PAIN SYNDROME: ICD-10-CM

## 2025-08-29 PROCEDURE — 74176 CT ABD & PELVIS W/O CONTRAST: CPT

## (undated) DEVICE — MAT PREVALON MOBL TRANSFR AIR WO/PAD 39X80IN

## (undated) DEVICE — SOL IRR H2O BTL 1000ML STRL

## (undated) DEVICE — SOL IRR NACL 0.9PCT BT 1000ML

## (undated) DEVICE — PK C/SECT 10

## (undated) DEVICE — SUT GUT CHRM 1 CTX 36IN 905H

## (undated) DEVICE — TRY SPINE BLCK WHITACRE 25G 3X5IN

## (undated) DEVICE — SUT PLAIN  3/0 CT1 27IN 842H

## (undated) DEVICE — GLV SURG SENSICARE W/ALOE PF LF 8 STRL

## (undated) DEVICE — PROXIMATE RH ROTATING HEAD SKIN STAPLERS (35 WIDE) CONTAINS 35 STAINLESS STEEL STAPLES: Brand: PROXIMATE

## (undated) DEVICE — SUT VIC 2/0 CT1 27IN J339H BX/36